# Patient Record
Sex: FEMALE | Race: WHITE | NOT HISPANIC OR LATINO | Employment: STUDENT | URBAN - METROPOLITAN AREA
[De-identification: names, ages, dates, MRNs, and addresses within clinical notes are randomized per-mention and may not be internally consistent; named-entity substitution may affect disease eponyms.]

---
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
## 2023-12-02 PROBLEM — Z34.00 PREGNANCY, SUPERVISION, NORMAL, FIRST: Status: ACTIVE | Noted: 2023-12-02

## 2023-12-02 PROBLEM — Z01.419 WELL WOMAN EXAM: Status: ACTIVE | Noted: 2023-12-02

## 2023-12-07 ENCOUNTER — LAB (OUTPATIENT)
Dept: LAB | Facility: HOSPITAL | Age: 23
End: 2023-12-07
Payer: COMMERCIAL

## 2023-12-07 ENCOUNTER — INITIAL PRENATAL (OUTPATIENT)
Dept: OBSTETRICS AND GYNECOLOGY | Facility: CLINIC | Age: 23
End: 2023-12-07
Payer: COMMERCIAL

## 2023-12-07 VITALS — BODY MASS INDEX: 25.46 KG/M2 | WEIGHT: 153 LBS | DIASTOLIC BLOOD PRESSURE: 76 MMHG | SYSTOLIC BLOOD PRESSURE: 124 MMHG

## 2023-12-07 DIAGNOSIS — Z71.85 VACCINE COUNSELING: ICD-10-CM

## 2023-12-07 DIAGNOSIS — Z34.01 ENCOUNTER FOR SUPERVISION OF NORMAL FIRST PREGNANCY IN FIRST TRIMESTER: Primary | ICD-10-CM

## 2023-12-07 DIAGNOSIS — Z34.01 ENCOUNTER FOR SUPERVISION OF NORMAL FIRST PREGNANCY IN FIRST TRIMESTER: ICD-10-CM

## 2023-12-07 PROBLEM — N08 HSP (HENOCH-SCHONLEIN PURPURA) NEPHRITIS: Status: ACTIVE | Noted: 2023-12-07

## 2023-12-07 PROBLEM — D69.0 HSP (HENOCH-SCHONLEIN PURPURA) NEPHRITIS: Status: ACTIVE | Noted: 2023-12-07

## 2023-12-07 LAB
ABO GROUP BLD: NORMAL
BASOPHILS # BLD AUTO: 0.02 10*3/MM3 (ref 0–0.2)
BASOPHILS NFR BLD AUTO: 0.2 % (ref 0–1.5)
BLD GP AB SCN SERPL QL: NEGATIVE
DEPRECATED RDW RBC AUTO: 36.4 FL (ref 37–54)
EOSINOPHIL # BLD AUTO: 0.02 10*3/MM3 (ref 0–0.4)
EOSINOPHIL NFR BLD AUTO: 0.2 % (ref 0.3–6.2)
ERYTHROCYTE [DISTWIDTH] IN BLOOD BY AUTOMATED COUNT: 11.3 % (ref 12.3–15.4)
HBV SURFACE AG SERPL QL IA: NORMAL
HCT VFR BLD AUTO: 42.6 % (ref 34–46.6)
HCV AB SER DONR QL: NORMAL
HGB BLD-MCNC: 14.6 G/DL (ref 12–15.9)
HIV 1+2 AB+HIV1 P24 AG SERPL QL IA: NORMAL
IMM GRANULOCYTES # BLD AUTO: 0.04 10*3/MM3 (ref 0–0.05)
IMM GRANULOCYTES NFR BLD AUTO: 0.3 % (ref 0–0.5)
LYMPHOCYTES # BLD AUTO: 1.81 10*3/MM3 (ref 0.7–3.1)
LYMPHOCYTES NFR BLD AUTO: 14.2 % (ref 19.6–45.3)
MCH RBC QN AUTO: 30.7 PG (ref 26.6–33)
MCHC RBC AUTO-ENTMCNC: 34.3 G/DL (ref 31.5–35.7)
MCV RBC AUTO: 89.7 FL (ref 79–97)
MONOCYTES # BLD AUTO: 0.59 10*3/MM3 (ref 0.1–0.9)
MONOCYTES NFR BLD AUTO: 4.6 % (ref 5–12)
NEUTROPHILS NFR BLD AUTO: 10.27 10*3/MM3 (ref 1.7–7)
NEUTROPHILS NFR BLD AUTO: 80.5 % (ref 42.7–76)
NRBC BLD AUTO-RTO: 0 /100 WBC (ref 0–0.2)
PLATELET # BLD AUTO: 283 10*3/MM3 (ref 140–450)
PMV BLD AUTO: 11.6 FL (ref 6–12)
RBC # BLD AUTO: 4.75 10*6/MM3 (ref 3.77–5.28)
RH BLD: POSITIVE
RPR SER QL: NORMAL
WBC NRBC COR # BLD AUTO: 12.75 10*3/MM3 (ref 3.4–10.8)

## 2023-12-07 PROCEDURE — 80081 OBSTETRIC PANEL INC HIV TSTG: CPT

## 2023-12-07 PROCEDURE — 86803 HEPATITIS C AB TEST: CPT

## 2023-12-07 RX ORDER — PROMETHAZINE HYDROCHLORIDE 25 MG/1
25 TABLET ORAL EVERY 6 HOURS PRN
Qty: 30 TABLET | Refills: 0 | Status: SHIPPED | OUTPATIENT
Start: 2023-12-07

## 2023-12-07 RX ORDER — ONDANSETRON 4 MG/1
4 TABLET, FILM COATED ORAL EVERY 6 HOURS PRN
Qty: 30 TABLET | Refills: 1 | Status: SHIPPED | OUTPATIENT
Start: 2023-12-07 | End: 2024-03-06

## 2023-12-07 RX ORDER — PRENATAL VIT NO.126/IRON/FOLIC 28MG-0.8MG
1 TABLET ORAL DAILY
COMMUNITY

## 2023-12-07 NOTE — PROGRESS NOTES
Subjective   Chief Complaint   Patient presents with    Initial Prenatal Visit     US ADRIÁN today, Struggling with n/v all day for last few weeks       Ynes Mcdaniel is a 23 y.o. year old .  No LMP recorded (lmp unknown). Patient is pregnant.  She presents to be seen to initiate prenatal care.     Social History    Tobacco Use      Smoking status: Never      Smokeless tobacco: Never      The following portions of the patient's history were reviewed and updated as appropriate:vital signs, allergies, current medications, past medical history, past social history, past surgical history, and problem list.    Objective   Lab Review   No data reviewed    Imaging   Pelvic ultrasound report    Assessment & Plan   ASSESSMENT  23 y.o. year old  at 8w4d  Supervision of low risk pregnancy    PLAN  The problem list for pregnancy was initiated today  Tests ordered today:  Orders Placed This Encounter   Procedures    Urine Culture - Urine, Urine, Clean Catch     Standing Status:   Future     Order Specific Question:   Release to patient     Answer:   Routine Release [[id number]]    Chlamydia trachomatis, Neisseria gonorrhoeae, PCR w/ confirmation - Urine, Urine, Clean Catch     Standing Status:   Future     Order Specific Question:   Release to patient     Answer:   Routine Release [[id number]]    Obstetric Panel     Standing Status:   Future     Order Specific Question:   Release to patient     Answer:   Routine Release [[id number]]    HIV-1 / O / 2 Ag / Antibody     Standing Status:   Future     Order Specific Question:   Release to patient     Answer:   Routine Release [[id number]]    Urine Drug Screen - Urine, Clean Catch     Please confirm all positive UDS     Standing Status:   Future     Order Specific Question:   Release to patient     Answer:   Routine Release [[id number]]     Testing for GC / Chlamydia / trichomonas was done today  Genetic testing reviewed: NIPT ([facility])  Her vaccine record was reviewed and  updated.  I discussed with Ynes that she may be behind on needed vaccinations for Influenza and COVID.  She may be able to obtain these vaccinations at her local pharmacy OR speak about obtaining them with her primary care.  If she does obtain her vaccines, I have asked Ynes to let us know the date each vaccine was obtained so that her medical record could be updated in our system.  Information reviewed: exercise in pregnancy, nutrition in pregnancy, weight gain in pregnancy, work and travel restrictions during pregnancy, list of OTC medications acceptable in pregnancy, and call coverage groups    Total time spent today with Ynes  was 30-44 minutes (level 3).  Off this time, 100% was spent face-to-face time coordinating care, answering her questions and counseling regarding exercise in pregnancy, nutrition in pregnancy, weight gain in pregnancy, work and travel restrictions during pregnancy, list of OTC medications acceptable in pregnancy and call coverage groups and pathophysiology of her presenting problem along with plans for any diagnositc work-up and treatment.  The time reported only includes face-to-face time and excludes any procedural based activities that occurred on the same date of service.    New Medications Ordered This Visit   Medications    promethazine (PHENERGAN) 25 MG tablet     Sig: Take 1 tablet by mouth Every 6 (Six) Hours As Needed for Nausea or Vomiting.     Dispense:  30 tablet     Refill:  0    ondansetron (Zofran) 4 MG tablet     Sig: Take 1 tablet by mouth Every 6 (Six) Hours As Needed for Nausea or Vomiting for up to 90 days.     Dispense:  30 tablet     Refill:  1       Follow up: 4 week(s)       This note was electronically signed.    Anatoly Lo MD  December 7, 2023

## 2023-12-08 LAB — RUBV IGG SERPL IA-ACNC: 4.57 INDEX

## 2023-12-11 ENCOUNTER — TELEPHONE (OUTPATIENT)
Dept: OBSTETRICS AND GYNECOLOGY | Facility: CLINIC | Age: 23
End: 2023-12-11
Payer: COMMERCIAL

## 2023-12-11 DIAGNOSIS — O36.8990 UMBILICAL CORD CYST DURING PREGNANCY, ANTEPARTUM: Primary | ICD-10-CM

## 2023-12-11 NOTE — TELEPHONE ENCOUNTER
Please call Ynes and let her know I have her had a chance to review her ultrasound.  Things overall look fine but there is a small cyst in the middle of the umbilical cord.  These almost never have any meaning and will almost always resolve after the first trimester.  When she comes back for her next scheduled prenatal visit I want to get an ultrasound done to relook at the umbilical cord cyst.

## 2023-12-15 NOTE — TELEPHONE ENCOUNTER
Pt informed and stated understanding.  Pt has an ultrasound scheduled for her next appt on 1/8/24.

## 2024-01-08 ENCOUNTER — LAB (OUTPATIENT)
Dept: LAB | Facility: HOSPITAL | Age: 24
End: 2024-01-08
Payer: COMMERCIAL

## 2024-01-08 ENCOUNTER — ROUTINE PRENATAL (OUTPATIENT)
Dept: OBSTETRICS AND GYNECOLOGY | Facility: CLINIC | Age: 24
End: 2024-01-08
Payer: COMMERCIAL

## 2024-01-08 VITALS — SYSTOLIC BLOOD PRESSURE: 110 MMHG | BODY MASS INDEX: 24.96 KG/M2 | WEIGHT: 150 LBS | DIASTOLIC BLOOD PRESSURE: 76 MMHG

## 2024-01-08 DIAGNOSIS — Z34.01 ENCOUNTER FOR SUPERVISION OF NORMAL FIRST PREGNANCY IN FIRST TRIMESTER: ICD-10-CM

## 2024-01-08 DIAGNOSIS — Z34.01 ENCOUNTER FOR SUPERVISION OF NORMAL FIRST PREGNANCY IN FIRST TRIMESTER: Primary | ICD-10-CM

## 2024-01-08 DIAGNOSIS — Z71.85 VACCINE COUNSELING: ICD-10-CM

## 2024-01-08 PROBLEM — O36.8990 UMBILICAL CORD CYST DURING PREGNANCY, ANTEPARTUM: Status: RESOLVED | Noted: 2023-12-11 | Resolved: 2024-01-08

## 2024-01-08 PROBLEM — R82.5 POSITIVE URINE DRUG SCREEN: Status: ACTIVE | Noted: 2024-01-08

## 2024-01-08 LAB
2ND TRIMESTER 4 SCREEN SERPL-IMP: NORMAL
AFP INTERP SERPL-IMP: NORMAL
AMPHET+METHAMPHET UR QL: NEGATIVE
AMPHETAMINES UR QL: NEGATIVE
BARBITURATES UR QL SCN: NEGATIVE
BENZODIAZ UR QL SCN: NEGATIVE
BUPRENORPHINE SERPL-MCNC: NEGATIVE NG/ML
CANNABINOIDS SERPL QL: POSITIVE
COCAINE UR QL: NEGATIVE
EXTERNAL NIPT: NORMAL
FENTANYL UR-MCNC: POSITIVE NG/ML
METHADONE UR QL SCN: NEGATIVE
OPIATES UR QL: NEGATIVE
OXYCODONE UR QL SCN: NEGATIVE
PCP UR QL SCN: NEGATIVE
TRICYCLICS UR QL SCN: NEGATIVE

## 2024-01-08 PROCEDURE — 87491 CHLMYD TRACH DNA AMP PROBE: CPT

## 2024-01-08 PROCEDURE — G0480 DRUG TEST DEF 1-7 CLASSES: HCPCS

## 2024-01-08 PROCEDURE — 80307 DRUG TEST PRSMV CHEM ANLYZR: CPT

## 2024-01-08 PROCEDURE — 0502F SUBSEQUENT PRENATAL CARE: CPT | Performed by: OBSTETRICS & GYNECOLOGY

## 2024-01-08 PROCEDURE — 87086 URINE CULTURE/COLONY COUNT: CPT

## 2024-01-08 PROCEDURE — 87591 N.GONORRHOEAE DNA AMP PROB: CPT

## 2024-01-08 NOTE — PROGRESS NOTES
Chief Complaint   Patient presents with    Routine Prenatal Visit       HPI: Ynes is a  currently at 13w1d who today reports the following:  Nausea - improved as compared to the prior visit; Vaginal bleeding -  No; Heartburn - No.    ROS:  GI: Constipation - No; Diarrhea - No    Neuro: Headache - No; Visual change - No    Respiratory: Cough - No; SOB - No; fever - No     EXAM:  Vitals: See prenatal flowsheet   Abdomen: See prenatal flowsheet   Urine glucose/protein: See prenatal flowsheet   Pelvic: See prenatal flowsheet     Prenatal Labs  Lab Results   Component Value Date    HGB 14.6 2023    RUBELLAABIGG 4.57 2023    HEPBSAG Non-Reactive 2023    ABSCRN Negative 2023    BFB4IQX7 Non-Reactive 2023    HEPCVIRUSABY Non-Reactive 2023    URINECX No growth 2019       MDM:  Impression: Supervision of low risk pregnancy  Umbilical cord cyst, resolved   Tests done today: U/S for follow-up of her umbilical cord cyst  Still needs urine testing from first visit   Topics discussed: Continue with PNV's  Prenatal labs reviewed  Today we discussed genetic testing.  They are aware that the NIPT - Panorama is a screening test.  A screening test is not a diagnostic test.  This means that a negative test does not guarantee an unaffected fetus and a positive test does not mean the fetus has the condition for which the test is being performed.  If the test returns positive, a diagnostic test should be consider to determine if the fetus in fact has the condition.  After considering the options previously presented, she is not interested in having genetic testing performed.  Has elected not to have influenza or COVID vaccinations while pregnant   Tests scheduled today for her next visit:   none

## 2024-01-09 LAB — BACTERIA SPEC AEROBE CULT: ABNORMAL

## 2024-01-12 LAB
C TRACH RRNA SPEC QL NAA+PROBE: NEGATIVE
CANNABINOIDS UR QL CFM: POSITIVE
FENTANYL UR CFM-MCNC: NOT DETECTED NG/MG CREAT
FENTANYL UR QL CFM: NEGATIVE
N GONORRHOEA RRNA SPEC QL NAA+PROBE: NEGATIVE
NORFENTANYL UR CFM-MCNC: NOT DETECTED NG/MG CREAT

## 2024-02-13 ENCOUNTER — ROUTINE PRENATAL (OUTPATIENT)
Dept: OBSTETRICS AND GYNECOLOGY | Facility: CLINIC | Age: 24
End: 2024-02-13
Payer: COMMERCIAL

## 2024-02-13 VITALS — DIASTOLIC BLOOD PRESSURE: 72 MMHG | WEIGHT: 161 LBS | SYSTOLIC BLOOD PRESSURE: 116 MMHG | BODY MASS INDEX: 26.79 KG/M2

## 2024-02-13 DIAGNOSIS — Z34.02 ENCOUNTER FOR SUPERVISION OF NORMAL FIRST PREGNANCY IN SECOND TRIMESTER: Primary | ICD-10-CM

## 2024-03-05 ENCOUNTER — ROUTINE PRENATAL (OUTPATIENT)
Dept: OBSTETRICS AND GYNECOLOGY | Facility: CLINIC | Age: 24
End: 2024-03-05
Payer: COMMERCIAL

## 2024-03-05 VITALS — BODY MASS INDEX: 27.46 KG/M2 | SYSTOLIC BLOOD PRESSURE: 118 MMHG | WEIGHT: 165 LBS | DIASTOLIC BLOOD PRESSURE: 74 MMHG

## 2024-03-05 DIAGNOSIS — Z34.02 ENCOUNTER FOR SUPERVISION OF NORMAL FIRST PREGNANCY IN SECOND TRIMESTER: Primary | ICD-10-CM

## 2024-03-05 PROCEDURE — 0502F SUBSEQUENT PRENATAL CARE: CPT | Performed by: OBSTETRICS & GYNECOLOGY

## 2024-03-05 NOTE — PROGRESS NOTES
Chief Complaint   Patient presents with    Routine Prenatal Visit       HPI: Ynes is a  currently at 21w2d who today reports the following:  Contractions - No; Leaking - No; Vaginal bleeding - No; Heartburn - No.    ROS:  GI: Nausea - No ; Constipation - No; Diarrhea - No    Neuro: Headache - No ; Visual change - No    Respiratory: Cough - No; SOB - No; fever - No     EXAM:  Vitals: See prenatal flowsheet   Abdomen: See prenatal flowsheet   Urine glucose/protein: See prenatal flowsheet   Pelvic: See prenatal flowsheet     Lab Results   Component Value Date    ABO B 2023    RH Positive 2023    ABSCRN Negative 2023       MDM:  Impression: Supervision of low risk pregnancy   Tests ordered/done today: U/S for anatomic screening - anatomy was not completely seen today   Counseling: Prenatal labs reviewed  Need to continue with PNV's  No additional counseling given - she has no specific complaints or concerns   Tests ordered today for her next visit:   U/S to complete the anatomic screening

## 2024-04-11 ENCOUNTER — ROUTINE PRENATAL (OUTPATIENT)
Dept: OBSTETRICS AND GYNECOLOGY | Facility: CLINIC | Age: 24
End: 2024-04-11
Payer: COMMERCIAL

## 2024-04-11 VITALS — SYSTOLIC BLOOD PRESSURE: 122 MMHG | DIASTOLIC BLOOD PRESSURE: 70 MMHG | BODY MASS INDEX: 28.29 KG/M2 | WEIGHT: 170 LBS

## 2024-04-11 DIAGNOSIS — O26.892 HEARTBURN DURING PREGNANCY IN SECOND TRIMESTER: ICD-10-CM

## 2024-04-11 DIAGNOSIS — Z34.02 ENCOUNTER FOR SUPERVISION OF NORMAL FIRST PREGNANCY IN SECOND TRIMESTER: Primary | ICD-10-CM

## 2024-04-11 DIAGNOSIS — R12 HEARTBURN DURING PREGNANCY IN SECOND TRIMESTER: ICD-10-CM

## 2024-04-11 PROBLEM — O26.899 HEARTBURN DURING PREGNANCY: Status: ACTIVE | Noted: 2024-04-11

## 2024-04-11 PROCEDURE — 0502F SUBSEQUENT PRENATAL CARE: CPT | Performed by: OBSTETRICS & GYNECOLOGY

## 2024-04-11 RX ORDER — FAMOTIDINE 20 MG/1
20 TABLET, FILM COATED ORAL 2 TIMES DAILY PRN
Start: 2024-04-11

## 2024-04-11 RX ORDER — OMEPRAZOLE 20 MG/1
20 TABLET, DELAYED RELEASE ORAL DAILY
Start: 2024-04-11

## 2024-04-11 NOTE — PROGRESS NOTES
Chief Complaint   Patient presents with    Routine Prenatal Visit       HPI: Ynes is a  currently at 26w4d who today reports the following:  Contractions - No; Leaking - No; Vaginal bleeding - No; Heartburn - YES.    ROS:  GI: Nausea - YES ; Constipation - No; Diarrhea - No    Neuro: Headache - No ; Visual change - No    Respiratory: Cough - No; SOB - No; fever - No     EXAM:  Vitals: See prenatal flowsheet   Abdomen: See prenatal flowsheet   Urine glucose/protein: See prenatal flowsheet   Pelvic: See prenatal flowsheet     Lab Results   Component Value Date    ABO B 2023    RH Positive 2023    ABSCRN Negative 2023       MDM:  Impression: Supervision of high risk pregnancy  GERD in pregnancy   Tests ordered/done today: U/S to complete the anatomic screening - anatomy completely seen today   Counseling: Prenatal labs reviewed  Need to continue with PNV's  OTC medical options for her N/V with GERD   Tests ordered today for her next visit:   GCT  HgB  RPR      New Medications Ordered This Visit   Medications    omeprazole OTC (PrilOSEC OTC) 20 MG EC tablet     Sig: Take 1 tablet by mouth Daily. Take daily 30 minutes prior to first morning meal    famotidine (Pepcid) 20 MG tablet     Sig: Take 1 tablet by mouth 2 (Two) Times a Day As Needed for Heartburn.

## 2024-04-29 ENCOUNTER — LAB (OUTPATIENT)
Dept: LAB | Facility: HOSPITAL | Age: 24
End: 2024-04-29
Payer: COMMERCIAL

## 2024-04-29 ENCOUNTER — ROUTINE PRENATAL (OUTPATIENT)
Dept: OBSTETRICS AND GYNECOLOGY | Facility: CLINIC | Age: 24
End: 2024-04-29
Payer: COMMERCIAL

## 2024-04-29 VITALS — DIASTOLIC BLOOD PRESSURE: 86 MMHG | WEIGHT: 173.6 LBS | BODY MASS INDEX: 28.89 KG/M2 | SYSTOLIC BLOOD PRESSURE: 120 MMHG

## 2024-04-29 DIAGNOSIS — O26.892 HEARTBURN DURING PREGNANCY IN SECOND TRIMESTER: ICD-10-CM

## 2024-04-29 DIAGNOSIS — R12 HEARTBURN DURING PREGNANCY IN SECOND TRIMESTER: ICD-10-CM

## 2024-04-29 DIAGNOSIS — Z34.02 ENCOUNTER FOR SUPERVISION OF NORMAL FIRST PREGNANCY IN SECOND TRIMESTER: ICD-10-CM

## 2024-04-29 DIAGNOSIS — Z34.03 ENCOUNTER FOR SUPERVISION OF NORMAL FIRST PREGNANCY IN THIRD TRIMESTER: ICD-10-CM

## 2024-04-29 DIAGNOSIS — Z3A.29 29 WEEKS GESTATION OF PREGNANCY: Primary | ICD-10-CM

## 2024-04-29 DIAGNOSIS — Z71.85 VACCINE COUNSELING: ICD-10-CM

## 2024-04-29 LAB
GLUCOSE 1H P 100 G GLC PO SERPL-MCNC: 79 MG/DL (ref 65–139)
GLUCOSE UR STRIP-MCNC: NEGATIVE MG/DL
HCT VFR BLD AUTO: 36.5 % (ref 34–46.6)
HGB BLD-MCNC: 12.4 G/DL (ref 12–15.9)
PROT UR STRIP-MCNC: NEGATIVE MG/DL

## 2024-04-29 PROCEDURE — 0502F SUBSEQUENT PRENATAL CARE: CPT | Performed by: NURSE PRACTITIONER

## 2024-04-29 PROCEDURE — 36415 COLL VENOUS BLD VENIPUNCTURE: CPT

## 2024-04-29 PROCEDURE — 86592 SYPHILIS TEST NON-TREP QUAL: CPT

## 2024-04-29 PROCEDURE — 85018 HEMOGLOBIN: CPT

## 2024-04-29 PROCEDURE — 85014 HEMATOCRIT: CPT

## 2024-04-29 PROCEDURE — 82950 GLUCOSE TEST: CPT

## 2024-04-29 PROCEDURE — 82948 REAGENT STRIP/BLOOD GLUCOSE: CPT

## 2024-04-29 NOTE — PROGRESS NOTES
Chief Complaint   Patient presents with    Routine Prenatal Visit       HPI: Ynes is a  currently at 29w1d who today reports the following:  Contractions - No; Leaking - No; Vaginal bleeding -  No; Heartburn - YES. Just started pepcid yesterday    ROS:  GI: Nausea - improved as compared to the prior visit ; Constipation - No; Diarrhea - No    Neuro: Headache - No ; Visual change - No      EXAM:  Vitals: See prenatal flowsheet   Abdomen: See prenatal flowsheet   Urine glucose/protein: See prenatal flowsheet   Pelvic: See prenatal flowsheet     Lab Results   Component Value Date    ABO B 2023    RH Positive 2023    ABSCRN Negative 2023       MDM:  Impression: Supervision of low risk pregnancy  Gerd in pregnancy- recently started pepcid   Tests done today: GCT  HgB  RPR   Topics discussed: Continue with PNV's  Prenatal labs reviewed   labor signs and symptoms  Symptoms of preeclampsia  TDAP vaccination recommended between 27-36 weeks gestation OR after birth  Kick counts reviewed  Continue pepcid bid, if no improvement notify provider  Plans to receive tdap at next appt.    Tests scheduled today for her next visit:   none

## 2024-04-30 LAB — RPR SER QL: NORMAL

## 2024-05-10 ENCOUNTER — ROUTINE PRENATAL (OUTPATIENT)
Dept: OBSTETRICS AND GYNECOLOGY | Facility: CLINIC | Age: 24
End: 2024-05-10
Payer: COMMERCIAL

## 2024-05-10 VITALS — WEIGHT: 175 LBS | SYSTOLIC BLOOD PRESSURE: 130 MMHG | BODY MASS INDEX: 29.12 KG/M2 | DIASTOLIC BLOOD PRESSURE: 70 MMHG

## 2024-05-10 DIAGNOSIS — Z34.03 ENCOUNTER FOR SUPERVISION OF NORMAL FIRST PREGNANCY IN THIRD TRIMESTER: ICD-10-CM

## 2024-05-10 DIAGNOSIS — R12 HEARTBURN DURING PREGNANCY IN SECOND TRIMESTER: ICD-10-CM

## 2024-05-10 DIAGNOSIS — Z3A.30 30 WEEKS GESTATION OF PREGNANCY: Primary | ICD-10-CM

## 2024-05-10 DIAGNOSIS — Z71.85 VACCINE COUNSELING: ICD-10-CM

## 2024-05-10 DIAGNOSIS — O26.892 HEARTBURN DURING PREGNANCY IN SECOND TRIMESTER: ICD-10-CM

## 2024-05-10 LAB
COLOR UR: NORMAL
GLUCOSE UR STRIP-MCNC: NEGATIVE MG/DL
PROT UR STRIP-MCNC: NEGATIVE MG/DL

## 2024-05-24 ENCOUNTER — ROUTINE PRENATAL (OUTPATIENT)
Dept: OBSTETRICS AND GYNECOLOGY | Facility: CLINIC | Age: 24
End: 2024-05-24
Payer: COMMERCIAL

## 2024-05-24 VITALS — SYSTOLIC BLOOD PRESSURE: 116 MMHG | WEIGHT: 177 LBS | BODY MASS INDEX: 29.45 KG/M2 | DIASTOLIC BLOOD PRESSURE: 72 MMHG

## 2024-05-24 DIAGNOSIS — Z34.03 ENCOUNTER FOR SUPERVISION OF NORMAL FIRST PREGNANCY IN THIRD TRIMESTER: Primary | ICD-10-CM

## 2024-05-24 DIAGNOSIS — Z71.85 VACCINE COUNSELING: ICD-10-CM

## 2024-05-24 DIAGNOSIS — O26.893 HEARTBURN DURING PREGNANCY IN THIRD TRIMESTER: ICD-10-CM

## 2024-05-24 DIAGNOSIS — R12 HEARTBURN DURING PREGNANCY IN THIRD TRIMESTER: ICD-10-CM

## 2024-05-24 NOTE — PATIENT INSTRUCTIONS

## 2024-05-24 NOTE — PROGRESS NOTES
Chief Complaint   Patient presents with    Routine Prenatal Visit       HPI: Ynes is a  currently at 32w5d who today reports the following:  Contractions - No; Leaking - No; Vaginal bleeding -  No; Swelling of extremities - No.    ROS:  GI: Nausea - improved as compared to the prior visit; Constipation - No; Diarrhea - No    Neuro: Headache - No; Visual change - No    Respiratory: Cough - No; SOB - No; fever - No     EXAM:  Vitals: See prenatal flowsheet   Abdomen: See prenatal flowsheet   Urine glucose/protein: See prenatal flowsheet   Pelvic: See prenatal flowsheet   MDM:   Impression: Supervision of high risk pregnancy  GERD in pregnancy   Tests done today: none   Topics discussed: Prenatal labs reviewed  Continue with Rx prenatal vitamins and OTC H2 blockers.  Breast feeding  Childbirth classes  TDAP vaccination recommended between 27-36 weeks gestation OR after birth  Circumcision was discussed.  It was explained to Ynes that the procedure is elective.  There are probably no long-term medical benefits for circumcision.  Studies have suggested in the first year of life, there may be a reduction in urinary tract infections in a circumcised male's.  This difference disappears after the first year of life.  Some have suggested that circumcision reduces nerve endings and the tip of the penis which could have an impact to him as he ages.  There are small risks of the procedure including taking off to much or too little skin from the foreskin.  He will be anesthetized and most of the time this is successful.  It cannot be guaranteed that the child will feel no pain.  After hearing this information, she wishes to have her son circumsized.   Tests scheduled today for her next visit:   none

## 2024-06-07 ENCOUNTER — ROUTINE PRENATAL (OUTPATIENT)
Dept: OBSTETRICS AND GYNECOLOGY | Facility: CLINIC | Age: 24
End: 2024-06-07
Payer: COMMERCIAL

## 2024-06-07 VITALS — BODY MASS INDEX: 29.79 KG/M2 | SYSTOLIC BLOOD PRESSURE: 110 MMHG | DIASTOLIC BLOOD PRESSURE: 72 MMHG | WEIGHT: 179 LBS

## 2024-06-07 DIAGNOSIS — R12 HEARTBURN DURING PREGNANCY IN THIRD TRIMESTER: ICD-10-CM

## 2024-06-07 DIAGNOSIS — Z34.03 ENCOUNTER FOR SUPERVISION OF NORMAL FIRST PREGNANCY IN THIRD TRIMESTER: Primary | ICD-10-CM

## 2024-06-07 DIAGNOSIS — O26.893 HEARTBURN DURING PREGNANCY IN THIRD TRIMESTER: ICD-10-CM

## 2024-06-07 LAB
GLUCOSE UR STRIP-MCNC: NEGATIVE MG/DL
PROT UR STRIP-MCNC: ABNORMAL MG/DL

## 2024-06-07 NOTE — PROGRESS NOTES
Chief Complaint   Patient presents with    Routine Prenatal Visit       HPI: Ynes is a  currently at 34w5d who today reports the following:  Contractions - No; Leaking - No; Vaginal bleeding -  No; Swelling of extremities - No.    ROS:  GI: Nausea - No; Constipation - No; Diarrhea - No    Neuro: Headache - No; Visual change - No    Respiratory: Cough - No; SOB - No; fever - No     EXAM:  Vitals: See prenatal flowsheet   Abdomen: See prenatal flowsheet   Urine glucose/protein: See prenatal flowsheet   Pelvic: See prenatal flowsheet   MDM:   Impression: Supervision of low risk pregnancy  Heartburn well-controlled with Pepcid   Tests done today: none   Topics discussed: Prenatal labs reviewed  Continue with Rx prenatal vitamins and OTC H2 blockers.  TDAP vaccination recommended between 27-36 weeks gestation OR after birth.  Right now she continues to decline at this   Tests scheduled today for her next visit:   none

## 2024-06-13 ENCOUNTER — ROUTINE PRENATAL (OUTPATIENT)
Dept: OBSTETRICS AND GYNECOLOGY | Facility: CLINIC | Age: 24
End: 2024-06-13
Payer: COMMERCIAL

## 2024-06-13 ENCOUNTER — HOSPITAL ENCOUNTER (INPATIENT)
Facility: HOSPITAL | Age: 24
LOS: 3 days | Discharge: HOME | End: 2024-06-16
Attending: OBSTETRICS & GYNECOLOGY | Admitting: OBSTETRICS & GYNECOLOGY
Payer: COMMERCIAL

## 2024-06-13 ENCOUNTER — ANESTHESIA (OUTPATIENT)
Dept: LABOR AND DELIVERY | Facility: HOSPITAL | Age: 24
End: 2024-06-13
Payer: COMMERCIAL

## 2024-06-13 ENCOUNTER — ANESTHESIA EVENT (OUTPATIENT)
Dept: LABOR AND DELIVERY | Facility: HOSPITAL | Age: 24
End: 2024-06-13
Payer: COMMERCIAL

## 2024-06-13 VITALS — DIASTOLIC BLOOD PRESSURE: 110 MMHG | BODY MASS INDEX: 30.45 KG/M2 | SYSTOLIC BLOOD PRESSURE: 150 MMHG | WEIGHT: 183 LBS

## 2024-06-13 DIAGNOSIS — O16.3 ELEVATED BLOOD PRESSURE AFFECTING PREGNANCY IN THIRD TRIMESTER, ANTEPARTUM: ICD-10-CM

## 2024-06-13 DIAGNOSIS — R12 HEARTBURN DURING PREGNANCY IN THIRD TRIMESTER: ICD-10-CM

## 2024-06-13 DIAGNOSIS — Z3A.35 35 WEEKS GESTATION OF PREGNANCY: Primary | ICD-10-CM

## 2024-06-13 DIAGNOSIS — Z36.85 ANTENATAL SCREENING FOR STREPTOCOCCUS B: ICD-10-CM

## 2024-06-13 DIAGNOSIS — O26.893 HEARTBURN DURING PREGNANCY IN THIRD TRIMESTER: ICD-10-CM

## 2024-06-13 DIAGNOSIS — Z34.03 ENCOUNTER FOR SUPERVISION OF NORMAL FIRST PREGNANCY IN THIRD TRIMESTER: ICD-10-CM

## 2024-06-13 PROBLEM — O14.13 SEVERE PREECLAMPSIA, THIRD TRIMESTER: Status: ACTIVE | Noted: 2024-06-13

## 2024-06-13 LAB
ABO GROUP BLD: NORMAL
ALP SERPL-CCNC: 113 U/L (ref 39–117)
ALT SERPL W P-5'-P-CCNC: 9 U/L (ref 1–33)
AST SERPL-CCNC: 14 U/L (ref 1–32)
BASOPHILS # BLD AUTO: 0.02 10*3/MM3 (ref 0–0.2)
BASOPHILS NFR BLD AUTO: 0.2 % (ref 0–1.5)
BILIRUB SERPL-MCNC: <0.2 MG/DL (ref 0–1.2)
BLD GP AB SCN SERPL QL: NEGATIVE
CREAT SERPL-MCNC: 0.85 MG/DL (ref 0.57–1)
DEPRECATED RDW RBC AUTO: 42 FL (ref 37–54)
EOSINOPHIL # BLD AUTO: 0.03 10*3/MM3 (ref 0–0.4)
EOSINOPHIL NFR BLD AUTO: 0.3 % (ref 0.3–6.2)
ERYTHROCYTE [DISTWIDTH] IN BLOOD BY AUTOMATED COUNT: 12.9 % (ref 12.3–15.4)
EXPIRATION DATE: ABNORMAL
GLUCOSE UR STRIP-MCNC: NEGATIVE MG/DL
HCT VFR BLD AUTO: 34.7 % (ref 34–46.6)
HGB BLD-MCNC: 11.5 G/DL (ref 12–15.9)
IMM GRANULOCYTES # BLD AUTO: 0.04 10*3/MM3 (ref 0–0.05)
IMM GRANULOCYTES NFR BLD AUTO: 0.5 % (ref 0–0.5)
LDH SERPL-CCNC: 235 U/L (ref 135–214)
LYMPHOCYTES # BLD AUTO: 1.78 10*3/MM3 (ref 0.7–3.1)
LYMPHOCYTES NFR BLD AUTO: 20.3 % (ref 19.6–45.3)
Lab: ABNORMAL
MCH RBC QN AUTO: 30 PG (ref 26.6–33)
MCHC RBC AUTO-ENTMCNC: 33.1 G/DL (ref 31.5–35.7)
MCV RBC AUTO: 90.6 FL (ref 79–97)
MONOCYTES # BLD AUTO: 0.63 10*3/MM3 (ref 0.1–0.9)
MONOCYTES NFR BLD AUTO: 7.2 % (ref 5–12)
NEUTROPHILS NFR BLD AUTO: 6.29 10*3/MM3 (ref 1.7–7)
NEUTROPHILS NFR BLD AUTO: 71.5 % (ref 42.7–76)
NRBC BLD AUTO-RTO: 0 /100 WBC (ref 0–0.2)
PLATELET # BLD AUTO: 203 10*3/MM3 (ref 140–450)
PMV BLD AUTO: 12.5 FL (ref 6–12)
PROT UR STRIP-MCNC: ABNORMAL MG/DL
PROT UR STRIP-MCNC: ABNORMAL MG/DL
RBC # BLD AUTO: 3.83 10*6/MM3 (ref 3.77–5.28)
RH BLD: POSITIVE
T PALLIDUM IGG SER QL: NORMAL
T&S EXPIRATION DATE: NORMAL
URATE SERPL-MCNC: 5.6 MG/DL (ref 2.4–5.7)
WBC NRBC COR # BLD AUTO: 8.79 10*3/MM3 (ref 3.4–10.8)

## 2024-06-13 PROCEDURE — 25010000002 LABETALOL 5 MG/ML SOLUTION: Performed by: OBSTETRICS & GYNECOLOGY

## 2024-06-13 PROCEDURE — 86850 RBC ANTIBODY SCREEN: CPT | Performed by: OBSTETRICS & GYNECOLOGY

## 2024-06-13 PROCEDURE — 85025 COMPLETE CBC W/AUTO DIFF WBC: CPT | Performed by: NURSE PRACTITIONER

## 2024-06-13 PROCEDURE — 86780 TREPONEMA PALLIDUM: CPT | Performed by: OBSTETRICS & GYNECOLOGY

## 2024-06-13 PROCEDURE — 84460 ALANINE AMINO (ALT) (SGPT): CPT | Performed by: NURSE PRACTITIONER

## 2024-06-13 PROCEDURE — C1755 CATHETER, INTRASPINAL: HCPCS | Performed by: ANESTHESIOLOGY

## 2024-06-13 PROCEDURE — 86901 BLOOD TYPING SEROLOGIC RH(D): CPT | Performed by: OBSTETRICS & GYNECOLOGY

## 2024-06-13 PROCEDURE — 83615 LACTATE (LD) (LDH) ENZYME: CPT | Performed by: NURSE PRACTITIONER

## 2024-06-13 PROCEDURE — 25810000003 LACTATED RINGERS PER 1000 ML: Performed by: OBSTETRICS & GYNECOLOGY

## 2024-06-13 PROCEDURE — 25010000002 CEFAZOLIN PER 500 MG: Performed by: OBSTETRICS & GYNECOLOGY

## 2024-06-13 PROCEDURE — 51703 INSERT BLADDER CATH COMPLEX: CPT

## 2024-06-13 PROCEDURE — 25010000002 HYDRALAZINE PER 20 MG: Performed by: OBSTETRICS & GYNECOLOGY

## 2024-06-13 PROCEDURE — 82247 BILIRUBIN TOTAL: CPT | Performed by: NURSE PRACTITIONER

## 2024-06-13 PROCEDURE — 82565 ASSAY OF CREATININE: CPT | Performed by: NURSE PRACTITIONER

## 2024-06-13 PROCEDURE — C1755 CATHETER, INTRASPINAL: HCPCS

## 2024-06-13 PROCEDURE — 25010000002 FENTANYL CITRATE (PF) 50 MCG/ML SOLUTION: Performed by: OBSTETRICS & GYNECOLOGY

## 2024-06-13 PROCEDURE — 81002 URINALYSIS NONAUTO W/O SCOPE: CPT | Performed by: NURSE PRACTITIONER

## 2024-06-13 PROCEDURE — 84075 ASSAY ALKALINE PHOSPHATASE: CPT | Performed by: NURSE PRACTITIONER

## 2024-06-13 PROCEDURE — 59025 FETAL NON-STRESS TEST: CPT

## 2024-06-13 PROCEDURE — 84450 TRANSFERASE (AST) (SGOT): CPT | Performed by: NURSE PRACTITIONER

## 2024-06-13 PROCEDURE — 25010000002 ROPIVACAINE PER 1 MG: Performed by: ANESTHESIOLOGY

## 2024-06-13 PROCEDURE — 36415 COLL VENOUS BLD VENIPUNCTURE: CPT | Performed by: NURSE PRACTITIONER

## 2024-06-13 PROCEDURE — 25810000003 SODIUM CHLORIDE 0.9 % SOLUTION: Performed by: ANESTHESIOLOGY

## 2024-06-13 PROCEDURE — 25010000002 BUPIVACAINE (PF) 0.5 % SOLUTION: Performed by: ANESTHESIOLOGY

## 2024-06-13 PROCEDURE — 86900 BLOOD TYPING SEROLOGIC ABO: CPT | Performed by: OBSTETRICS & GYNECOLOGY

## 2024-06-13 PROCEDURE — 59200 INSERT CERVICAL DILATOR: CPT | Performed by: OBSTETRICS & GYNECOLOGY

## 2024-06-13 PROCEDURE — 25010000002 MORPHINE PER 10 MG: Performed by: OBSTETRICS & GYNECOLOGY

## 2024-06-13 PROCEDURE — 25010000002 FENTANYL CITRATE (PF) 100 MCG/2ML SOLUTION: Performed by: ANESTHESIOLOGY

## 2024-06-13 PROCEDURE — 10907ZC DRAINAGE OF AMNIOTIC FLUID, THERAPEUTIC FROM PRODUCTS OF CONCEPTION, VIA NATURAL OR ARTIFICIAL OPENING: ICD-10-PCS | Performed by: OBSTETRICS & GYNECOLOGY

## 2024-06-13 PROCEDURE — 84550 ASSAY OF BLOOD/URIC ACID: CPT | Performed by: NURSE PRACTITIONER

## 2024-06-13 RX ORDER — SODIUM CHLORIDE 0.9 % (FLUSH) 0.9 %
10 SYRINGE (ML) INJECTION EVERY 12 HOURS SCHEDULED
Status: DISCONTINUED | OUTPATIENT
Start: 2024-06-13 | End: 2024-06-14

## 2024-06-13 RX ORDER — SODIUM CHLORIDE 0.9 % (FLUSH) 0.9 %
10 SYRINGE (ML) INJECTION AS NEEDED
Status: DISCONTINUED | OUTPATIENT
Start: 2024-06-13 | End: 2024-06-14

## 2024-06-13 RX ORDER — LABETALOL 200 MG/1
100 TABLET, FILM COATED ORAL EVERY 8 HOURS SCHEDULED
Status: DISCONTINUED | OUTPATIENT
Start: 2024-06-13 | End: 2024-06-14

## 2024-06-13 RX ORDER — MORPHINE SULFATE 2 MG/ML
1 INJECTION, SOLUTION INTRAMUSCULAR; INTRAVENOUS EVERY 4 HOURS PRN
Status: DISCONTINUED | OUTPATIENT
Start: 2024-06-13 | End: 2024-06-13 | Stop reason: SDUPTHER

## 2024-06-13 RX ORDER — EPHEDRINE SULFATE 5 MG/ML
10 INJECTION INTRAVENOUS
Status: DISCONTINUED | OUTPATIENT
Start: 2024-06-13 | End: 2024-06-14

## 2024-06-13 RX ORDER — MISOPROSTOL 100 MCG
50 TABLET ORAL
Status: COMPLETED | OUTPATIENT
Start: 2024-06-13 | End: 2024-06-13

## 2024-06-13 RX ORDER — METOCLOPRAMIDE HYDROCHLORIDE 5 MG/ML
10 INJECTION INTRAMUSCULAR; INTRAVENOUS ONCE AS NEEDED
Status: DISCONTINUED | OUTPATIENT
Start: 2024-06-13 | End: 2024-06-14

## 2024-06-13 RX ORDER — HYDROXYZINE HYDROCHLORIDE 25 MG/1
50 TABLET, FILM COATED ORAL NIGHTLY PRN
Status: DISCONTINUED | OUTPATIENT
Start: 2024-06-13 | End: 2024-06-14

## 2024-06-13 RX ORDER — CITRIC ACID/SODIUM CITRATE 334-500MG
30 SOLUTION, ORAL ORAL ONCE
Status: DISCONTINUED | OUTPATIENT
Start: 2024-06-13 | End: 2024-06-14

## 2024-06-13 RX ORDER — MAGNESIUM CARB/ALUMINUM HYDROX 105-160MG
30 TABLET,CHEWABLE ORAL ONCE
Status: COMPLETED | OUTPATIENT
Start: 2024-06-13 | End: 2024-06-14

## 2024-06-13 RX ORDER — OXYTOCIN/0.9 % SODIUM CHLORIDE 30/500 ML
2-20 PLASTIC BAG, INJECTION (ML) INTRAVENOUS
Status: DISCONTINUED | OUTPATIENT
Start: 2024-06-14 | End: 2024-06-14

## 2024-06-13 RX ORDER — NALOXONE HCL 0.4 MG/ML
0.4 VIAL (ML) INJECTION
Status: DISCONTINUED | OUTPATIENT
Start: 2024-06-13 | End: 2024-06-14

## 2024-06-13 RX ORDER — ONDANSETRON 2 MG/ML
4 INJECTION INTRAMUSCULAR; INTRAVENOUS EVERY 6 HOURS PRN
Status: DISCONTINUED | OUTPATIENT
Start: 2024-06-13 | End: 2024-06-14

## 2024-06-13 RX ORDER — SODIUM CHLORIDE, SODIUM LACTATE, POTASSIUM CHLORIDE, CALCIUM CHLORIDE 600; 310; 30; 20 MG/100ML; MG/100ML; MG/100ML; MG/100ML
125 INJECTION, SOLUTION INTRAVENOUS CONTINUOUS
Status: DISCONTINUED | OUTPATIENT
Start: 2024-06-13 | End: 2024-06-14

## 2024-06-13 RX ORDER — ACETAMINOPHEN 325 MG/1
650 TABLET ORAL EVERY 4 HOURS PRN
Status: DISCONTINUED | OUTPATIENT
Start: 2024-06-13 | End: 2024-06-14

## 2024-06-13 RX ORDER — ONDANSETRON 2 MG/ML
4 INJECTION INTRAMUSCULAR; INTRAVENOUS ONCE AS NEEDED
Status: DISCONTINUED | OUTPATIENT
Start: 2024-06-13 | End: 2024-06-14

## 2024-06-13 RX ORDER — MAGNESIUM SULFATE HEPTAHYDRATE 40 MG/ML
INJECTION, SOLUTION INTRAVENOUS
Status: COMPLETED
Start: 2024-06-13 | End: 2024-06-13

## 2024-06-13 RX ORDER — SODIUM CHLORIDE 9 MG/ML
40 INJECTION, SOLUTION INTRAVENOUS AS NEEDED
Status: DISCONTINUED | OUTPATIENT
Start: 2024-06-13 | End: 2024-06-14

## 2024-06-13 RX ORDER — MORPHINE SULFATE 2 MG/ML
2 INJECTION, SOLUTION INTRAMUSCULAR; INTRAVENOUS EVERY 4 HOURS PRN
Status: DISCONTINUED | OUTPATIENT
Start: 2024-06-13 | End: 2024-06-13 | Stop reason: SDUPTHER

## 2024-06-13 RX ORDER — FENTANYL CITRATE 50 UG/ML
100 INJECTION, SOLUTION INTRAMUSCULAR; INTRAVENOUS
Status: DISCONTINUED | OUTPATIENT
Start: 2024-06-13 | End: 2024-06-14

## 2024-06-13 RX ORDER — LIDOCAINE HYDROCHLORIDE AND EPINEPHRINE 15; 5 MG/ML; UG/ML
INJECTION, SOLUTION EPIDURAL AS NEEDED
Status: DISCONTINUED | OUTPATIENT
Start: 2024-06-13 | End: 2024-06-14 | Stop reason: SURG

## 2024-06-13 RX ORDER — BUPIVACAINE HYDROCHLORIDE 5 MG/ML
INJECTION, SOLUTION EPIDURAL; INTRACAUDAL AS NEEDED
Status: DISCONTINUED | OUTPATIENT
Start: 2024-06-13 | End: 2024-06-14 | Stop reason: SURG

## 2024-06-13 RX ORDER — PENICILLIN G 3000000 [IU]/50ML
3 INJECTION, SOLUTION INTRAVENOUS EVERY 4 HOURS
Qty: 600 ML | Refills: 0 | Status: DISCONTINUED | OUTPATIENT
Start: 2024-06-13 | End: 2024-06-13 | Stop reason: SDUPTHER

## 2024-06-13 RX ORDER — MAGNESIUM SULFATE HEPTAHYDRATE 40 MG/ML
2 INJECTION, SOLUTION INTRAVENOUS CONTINUOUS
Status: DISCONTINUED | OUTPATIENT
Start: 2024-06-13 | End: 2024-06-14

## 2024-06-13 RX ORDER — HYDRALAZINE HYDROCHLORIDE 20 MG/ML
5 INJECTION INTRAMUSCULAR; INTRAVENOUS EVERY 6 HOURS PRN
Status: DISCONTINUED | OUTPATIENT
Start: 2024-06-13 | End: 2024-06-14

## 2024-06-13 RX ORDER — FENTANYL CITRATE 50 UG/ML
50 INJECTION, SOLUTION INTRAMUSCULAR; INTRAVENOUS
Status: DISCONTINUED | OUTPATIENT
Start: 2024-06-13 | End: 2024-06-14

## 2024-06-13 RX ORDER — LABETALOL HYDROCHLORIDE 5 MG/ML
20-80 INJECTION, SOLUTION INTRAVENOUS
Status: DISPENSED | OUTPATIENT
Start: 2024-06-13 | End: 2024-06-14

## 2024-06-13 RX ORDER — NALOXONE HCL 0.4 MG/ML
0.4 VIAL (ML) INJECTION
Status: DISCONTINUED | OUTPATIENT
Start: 2024-06-13 | End: 2024-06-13 | Stop reason: SDUPTHER

## 2024-06-13 RX ORDER — LIDOCAINE HYDROCHLORIDE 10 MG/ML
0.5 INJECTION, SOLUTION EPIDURAL; INFILTRATION; INTRACAUDAL; PERINEURAL ONCE AS NEEDED
Status: DISCONTINUED | OUTPATIENT
Start: 2024-06-13 | End: 2024-06-14

## 2024-06-13 RX ORDER — DIPHENHYDRAMINE HYDROCHLORIDE 50 MG/ML
12.5 INJECTION INTRAMUSCULAR; INTRAVENOUS EVERY 8 HOURS PRN
Status: DISCONTINUED | OUTPATIENT
Start: 2024-06-13 | End: 2024-06-14

## 2024-06-13 RX ORDER — MISOPROSTOL 100 MCG
50 TABLET ORAL
Status: DISCONTINUED | OUTPATIENT
Start: 2024-06-13 | End: 2024-06-13 | Stop reason: SDUPTHER

## 2024-06-13 RX ORDER — FAMOTIDINE 10 MG/ML
20 INJECTION, SOLUTION INTRAVENOUS ONCE AS NEEDED
Status: COMPLETED | OUTPATIENT
Start: 2024-06-13 | End: 2024-06-14

## 2024-06-13 RX ADMIN — Medication 50 MCG: at 22:40

## 2024-06-13 RX ADMIN — FENTANYL CITRATE 100 MCG: 50 INJECTION, SOLUTION INTRAMUSCULAR; INTRAVENOUS at 21:27

## 2024-06-13 RX ADMIN — LABETALOL HYDROCHLORIDE 20 MG: 5 INJECTION, SOLUTION INTRAVENOUS at 20:15

## 2024-06-13 RX ADMIN — LABETALOL HYDROCHLORIDE 20 MG: 5 INJECTION, SOLUTION INTRAVENOUS at 17:35

## 2024-06-13 RX ADMIN — SODIUM CHLORIDE 2000 MG: 900 INJECTION INTRAVENOUS at 18:50

## 2024-06-13 RX ADMIN — LIDOCAINE HYDROCHLORIDE AND EPINEPHRINE 3 ML: 15; 5 INJECTION, SOLUTION EPIDURAL at 22:30

## 2024-06-13 RX ADMIN — LABETALOL HYDROCHLORIDE 40 MG: 5 INJECTION, SOLUTION INTRAVENOUS at 17:55

## 2024-06-13 RX ADMIN — BUPIVACAINE HYDROCHLORIDE 6 ML: 5 INJECTION, SOLUTION EPIDURAL; INTRACAUDAL at 22:30

## 2024-06-13 RX ADMIN — SODIUM CHLORIDE, POTASSIUM CHLORIDE, SODIUM LACTATE AND CALCIUM CHLORIDE 75 ML/HR: 600; 310; 30; 20 INJECTION, SOLUTION INTRAVENOUS at 17:08

## 2024-06-13 RX ADMIN — LABETALOL HYDROCHLORIDE 40 MG: 5 INJECTION, SOLUTION INTRAVENOUS at 20:31

## 2024-06-13 RX ADMIN — Medication 50 MCG: at 17:50

## 2024-06-13 RX ADMIN — MORPHINE SULFATE 1 MG: 2 INJECTION, SOLUTION INTRAMUSCULAR; INTRAVENOUS at 20:15

## 2024-06-13 RX ADMIN — MAGNESIUM SULFATE HEPTAHYDRATE 4 G: 40 INJECTION, SOLUTION INTRAVENOUS at 17:10

## 2024-06-13 RX ADMIN — LABETALOL HYDROCHLORIDE 80 MG: 5 INJECTION, SOLUTION INTRAVENOUS at 18:26

## 2024-06-13 RX ADMIN — HYDRALAZINE HYDROCHLORIDE 5 MG: 20 INJECTION INTRAMUSCULAR; INTRAVENOUS at 21:46

## 2024-06-13 NOTE — PROGRESS NOTES
Chief Complaint   Patient presents with    Routine Prenatal Visit     GBS today       HPI: Ynes is a  currently at 35w4d who today reports the following: She reports good fetal movement.   Contractions - No; Leaking - No; Vaginal bleeding -  No; Swelling of extremities - YES. Increased over the last few days.     ROS:  GI: Nausea - No; Constipation - No; Diarrhea - No    Neuro: Headache - No; Visual change - No      EXAM:  Vitals: See prenatal flowsheet   Abdomen: See prenatal flowsheet   Urine glucose/protein: See prenatal flowsheet   Pelvic: See prenatal flowsheet   MDM:   Impression: Supervision of low risk pregnancy  Elevated blood pressure in third trimester of pregnancy  Heartburn well controlled with pepcid    Tests done today: GBS testing   Topics discussed: Continue with PNV's  Prenatal labs reviewed  Discussed patient with Dr Lo, will have her go to labor and delivery for further evaluation.    Tests scheduled today for her next visit:   none

## 2024-06-13 NOTE — PROGRESS NOTES
23 y.o.  at 35w 4d   OB History          1    Para        Term                AB        Living             SAB        IAB        Ectopic        Molar        Multiple        Live Births                 Presents as an induction of labor due to preeclampsia with severe features and rigid cervix  Her primary OB requests a Fraga Bulb placement to initiate the induction of labor.    Fetal Heart Rate Assessment   Method: Fetal HR Assessment Method: external   Beats/min: Fetal HR (beats/min): 140   Baseline: Fetal HR Baseline: normal range   Varibility: Fetal HR Variability: moderate (amplitude range 6 to 25 bpm)   Accels: Fetal HR Accelerations: greater than/equal to 15 bpm, lasting at least 15 seconds   Decels: Fetal HR Decelerations: absent   Tracing Category:       TOCO  SVE closed/50%/-2 ultrasound Vertex in office.    A Fraga Bulb was placed without difficulties with 60 mL of sterile saline.  The patient tolerated the procedure well.

## 2024-06-14 PROBLEM — Z34.03 ENCOUNTER FOR SUPERVISION OF NORMAL FIRST PREGNANCY IN THIRD TRIMESTER: Status: RESOLVED | Noted: 2023-12-02 | Resolved: 2024-06-14

## 2024-06-14 PROBLEM — O26.899 HEARTBURN DURING PREGNANCY: Status: RESOLVED | Noted: 2024-04-11 | Resolved: 2024-06-14

## 2024-06-14 PROBLEM — R12 HEARTBURN DURING PREGNANCY: Status: RESOLVED | Noted: 2024-04-11 | Resolved: 2024-06-14

## 2024-06-14 PROBLEM — R82.5 POSITIVE URINE DRUG SCREEN: Status: RESOLVED | Noted: 2024-01-08 | Resolved: 2024-06-14

## 2024-06-14 PROCEDURE — 25010000002 ROPIVACAINE PER 1 MG: Performed by: NURSE ANESTHETIST, CERTIFIED REGISTERED

## 2024-06-14 PROCEDURE — 25010000002 HYDRALAZINE PER 20 MG: Performed by: OBSTETRICS & GYNECOLOGY

## 2024-06-14 PROCEDURE — 0KQM0ZZ REPAIR PERINEUM MUSCLE, OPEN APPROACH: ICD-10-PCS | Performed by: OBSTETRICS & GYNECOLOGY

## 2024-06-14 PROCEDURE — 25810000003 LACTATED RINGERS PER 1000 ML: Performed by: OBSTETRICS & GYNECOLOGY

## 2024-06-14 PROCEDURE — 25010000002 MAGNESIUM SULFATE 20 GM/500ML SOLUTION: Performed by: OBSTETRICS & GYNECOLOGY

## 2024-06-14 PROCEDURE — 94799 UNLISTED PULMONARY SVC/PX: CPT

## 2024-06-14 PROCEDURE — 25010000002 LABETALOL 5 MG/ML SOLUTION

## 2024-06-14 PROCEDURE — 59400 OBSTETRICAL CARE: CPT | Performed by: OBSTETRICS & GYNECOLOGY

## 2024-06-14 PROCEDURE — 0UQMXZZ REPAIR VULVA, EXTERNAL APPROACH: ICD-10-PCS | Performed by: OBSTETRICS & GYNECOLOGY

## 2024-06-14 PROCEDURE — 25010000002 LABETALOL 5 MG/ML SOLUTION: Performed by: OBSTETRICS & GYNECOLOGY

## 2024-06-14 PROCEDURE — 25010000002 CEFAZOLIN PER 500 MG: Performed by: OBSTETRICS & GYNECOLOGY

## 2024-06-14 PROCEDURE — 25010000002 ONDANSETRON PER 1 MG: Performed by: OBSTETRICS & GYNECOLOGY

## 2024-06-14 RX ORDER — IBUPROFEN 600 MG/1
600 TABLET ORAL EVERY 6 HOURS PRN
Status: DISCONTINUED | OUTPATIENT
Start: 2024-06-14 | End: 2024-06-16 | Stop reason: HOSPADM

## 2024-06-14 RX ORDER — ACETAMINOPHEN 325 MG/1
650 TABLET ORAL EVERY 6 HOURS PRN
Status: DISCONTINUED | OUTPATIENT
Start: 2024-06-14 | End: 2024-06-16 | Stop reason: HOSPADM

## 2024-06-14 RX ORDER — MISOPROSTOL 200 UG/1
600 TABLET ORAL ONCE AS NEEDED
Status: DISCONTINUED | OUTPATIENT
Start: 2024-06-14 | End: 2024-06-16 | Stop reason: HOSPADM

## 2024-06-14 RX ORDER — LABETALOL HYDROCHLORIDE 5 MG/ML
20-80 INJECTION, SOLUTION INTRAVENOUS
Status: DISCONTINUED | OUTPATIENT
Start: 2024-06-14 | End: 2024-06-14

## 2024-06-14 RX ORDER — HYDROCODONE BITARTRATE AND ACETAMINOPHEN 5; 325 MG/1; MG/1
1 TABLET ORAL EVERY 4 HOURS PRN
Status: DISCONTINUED | OUTPATIENT
Start: 2024-06-14 | End: 2024-06-16 | Stop reason: HOSPADM

## 2024-06-14 RX ORDER — PROMETHAZINE HYDROCHLORIDE 12.5 MG/1
12.5 SUPPOSITORY RECTAL EVERY 6 HOURS PRN
Status: DISCONTINUED | OUTPATIENT
Start: 2024-06-14 | End: 2024-06-16

## 2024-06-14 RX ORDER — HYDRALAZINE HYDROCHLORIDE 20 MG/ML
10 INJECTION INTRAMUSCULAR; INTRAVENOUS EVERY 4 HOURS PRN
Status: DISCONTINUED | OUTPATIENT
Start: 2024-06-14 | End: 2024-06-16 | Stop reason: HOSPADM

## 2024-06-14 RX ORDER — LIDOCAINE HCL/EPINEPHRINE/PF 2%-1:200K
VIAL (ML) INJECTION AS NEEDED
Status: DISCONTINUED | OUTPATIENT
Start: 2024-06-14 | End: 2024-06-14 | Stop reason: SURG

## 2024-06-14 RX ORDER — SODIUM CHLORIDE, SODIUM LACTATE, POTASSIUM CHLORIDE, CALCIUM CHLORIDE 600; 310; 30; 20 MG/100ML; MG/100ML; MG/100ML; MG/100ML
75 INJECTION, SOLUTION INTRAVENOUS CONTINUOUS
Status: DISCONTINUED | OUTPATIENT
Start: 2024-06-14 | End: 2024-06-16 | Stop reason: HOSPADM

## 2024-06-14 RX ORDER — LABETALOL HYDROCHLORIDE 5 MG/ML
INJECTION, SOLUTION INTRAVENOUS
Status: COMPLETED
Start: 2024-06-14 | End: 2024-06-14

## 2024-06-14 RX ORDER — CARBOPROST TROMETHAMINE 250 UG/ML
250 INJECTION, SOLUTION INTRAMUSCULAR ONCE AS NEEDED
Status: DISCONTINUED | OUTPATIENT
Start: 2024-06-14 | End: 2024-06-16 | Stop reason: HOSPADM

## 2024-06-14 RX ORDER — LABETALOL HYDROCHLORIDE 5 MG/ML
20-80 INJECTION, SOLUTION INTRAVENOUS
Status: DISCONTINUED | OUTPATIENT
Start: 2024-06-14 | End: 2024-06-16 | Stop reason: HOSPADM

## 2024-06-14 RX ORDER — OXYTOCIN/0.9 % SODIUM CHLORIDE 30/500 ML
999 PLASTIC BAG, INJECTION (ML) INTRAVENOUS ONCE
Status: DISCONTINUED | OUTPATIENT
Start: 2024-06-14 | End: 2024-06-16

## 2024-06-14 RX ORDER — PROMETHAZINE HYDROCHLORIDE 12.5 MG/1
12.5 TABLET ORAL EVERY 6 HOURS PRN
Status: DISCONTINUED | OUTPATIENT
Start: 2024-06-14 | End: 2024-06-16

## 2024-06-14 RX ORDER — HYDROCORTISONE 25 MG/G
1 CREAM TOPICAL AS NEEDED
Status: DISCONTINUED | OUTPATIENT
Start: 2024-06-14 | End: 2024-06-16 | Stop reason: HOSPADM

## 2024-06-14 RX ORDER — OXYTOCIN/0.9 % SODIUM CHLORIDE 30/500 ML
250 PLASTIC BAG, INJECTION (ML) INTRAVENOUS CONTINUOUS
Status: DISCONTINUED | OUTPATIENT
Start: 2024-06-14 | End: 2024-06-14

## 2024-06-14 RX ORDER — METHYLERGONOVINE MALEATE 0.2 MG/ML
200 INJECTION INTRAVENOUS ONCE AS NEEDED
Status: DISCONTINUED | OUTPATIENT
Start: 2024-06-14 | End: 2024-06-14 | Stop reason: SDUPTHER

## 2024-06-14 RX ORDER — CARBOPROST TROMETHAMINE 250 UG/ML
250 INJECTION, SOLUTION INTRAMUSCULAR AS NEEDED
Status: DISCONTINUED | OUTPATIENT
Start: 2024-06-14 | End: 2024-06-14 | Stop reason: SDUPTHER

## 2024-06-14 RX ORDER — MAGNESIUM SULFATE HEPTAHYDRATE 40 MG/ML
2 INJECTION, SOLUTION INTRAVENOUS CONTINUOUS
Status: DISCONTINUED | OUTPATIENT
Start: 2024-06-14 | End: 2024-06-16 | Stop reason: HOSPADM

## 2024-06-14 RX ORDER — DOCUSATE SODIUM 100 MG/1
100 CAPSULE, LIQUID FILLED ORAL 2 TIMES DAILY
Status: DISCONTINUED | OUTPATIENT
Start: 2024-06-14 | End: 2024-06-16 | Stop reason: HOSPADM

## 2024-06-14 RX ORDER — MORPHINE SULFATE 2 MG/ML
2 INJECTION, SOLUTION INTRAMUSCULAR; INTRAVENOUS
Status: DISCONTINUED | OUTPATIENT
Start: 2024-06-14 | End: 2024-06-15

## 2024-06-14 RX ORDER — LABETALOL 200 MG/1
200 TABLET, FILM COATED ORAL EVERY 8 HOURS SCHEDULED
Status: DISCONTINUED | OUTPATIENT
Start: 2024-06-14 | End: 2024-06-15

## 2024-06-14 RX ORDER — MISOPROSTOL 200 UG/1
800 TABLET ORAL AS NEEDED
Status: DISCONTINUED | OUTPATIENT
Start: 2024-06-14 | End: 2024-06-14 | Stop reason: SDUPTHER

## 2024-06-14 RX ORDER — OXYCODONE HYDROCHLORIDE AND ACETAMINOPHEN 5; 325 MG/1; MG/1
1 TABLET ORAL EVERY 4 HOURS PRN
Status: DISCONTINUED | OUTPATIENT
Start: 2024-06-14 | End: 2024-06-16

## 2024-06-14 RX ORDER — BISACODYL 10 MG
10 SUPPOSITORY, RECTAL RECTAL DAILY PRN
Status: DISCONTINUED | OUTPATIENT
Start: 2024-06-15 | End: 2024-06-16 | Stop reason: HOSPADM

## 2024-06-14 RX ORDER — ROPIVACAINE HYDROCHLORIDE 2 MG/ML
15 INJECTION, SOLUTION EPIDURAL; INFILTRATION; PERINEURAL CONTINUOUS
Status: DISCONTINUED | OUTPATIENT
Start: 2024-06-14 | End: 2024-06-14

## 2024-06-14 RX ORDER — IBUPROFEN 600 MG/1
600 TABLET ORAL EVERY 6 HOURS PRN
Status: DISCONTINUED | OUTPATIENT
Start: 2024-06-14 | End: 2024-06-14 | Stop reason: SDUPTHER

## 2024-06-14 RX ORDER — METHYLERGONOVINE MALEATE 0.2 MG/ML
200 INJECTION INTRAVENOUS ONCE AS NEEDED
Status: DISCONTINUED | OUTPATIENT
Start: 2024-06-14 | End: 2024-06-16 | Stop reason: HOSPADM

## 2024-06-14 RX ORDER — HYDROCODONE BITARTRATE AND ACETAMINOPHEN 10; 325 MG/1; MG/1
1 TABLET ORAL EVERY 4 HOURS PRN
Status: DISCONTINUED | OUTPATIENT
Start: 2024-06-14 | End: 2024-06-16 | Stop reason: HOSPADM

## 2024-06-14 RX ORDER — ONDANSETRON 2 MG/ML
4 INJECTION INTRAMUSCULAR; INTRAVENOUS EVERY 6 HOURS PRN
Status: DISCONTINUED | OUTPATIENT
Start: 2024-06-14 | End: 2024-06-16

## 2024-06-14 RX ORDER — OXYTOCIN/0.9 % SODIUM CHLORIDE 30/500 ML
125 PLASTIC BAG, INJECTION (ML) INTRAVENOUS ONCE AS NEEDED
Status: DISCONTINUED | OUTPATIENT
Start: 2024-06-14 | End: 2024-06-16 | Stop reason: HOSPADM

## 2024-06-14 RX ORDER — ONDANSETRON 4 MG/1
4 TABLET, ORALLY DISINTEGRATING ORAL EVERY 6 HOURS PRN
Status: DISCONTINUED | OUTPATIENT
Start: 2024-06-14 | End: 2024-06-16

## 2024-06-14 RX ADMIN — EPHEDRINE SULFATE 10 MG: 5 INJECTION INTRAVENOUS at 01:19

## 2024-06-14 RX ADMIN — WITCH HAZEL: 500 SOLUTION RECTAL; TOPICAL at 18:35

## 2024-06-14 RX ADMIN — FAMOTIDINE 20 MG: 10 INJECTION, SOLUTION INTRAVENOUS at 06:25

## 2024-06-14 RX ADMIN — MAGNESIUM SULFATE HEPTAHYDRATE 2 G/HR: 40 INJECTION, SOLUTION INTRAVENOUS at 12:51

## 2024-06-14 RX ADMIN — ROPIVACAINE HYDROCHLORIDE 15 ML/HR: 2 INJECTION, SOLUTION EPIDURAL; INFILTRATION at 12:50

## 2024-06-14 RX ADMIN — LABETALOL HYDROCHLORIDE 200 MG: 200 TABLET, FILM COATED ORAL at 22:03

## 2024-06-14 RX ADMIN — EPHEDRINE SULFATE 10 MG: 5 INJECTION INTRAVENOUS at 02:22

## 2024-06-14 RX ADMIN — SODIUM CHLORIDE, POTASSIUM CHLORIDE, SODIUM LACTATE AND CALCIUM CHLORIDE 75 ML/HR: 600; 310; 30; 20 INJECTION, SOLUTION INTRAVENOUS at 00:58

## 2024-06-14 RX ADMIN — LIDOCAINE HYDROCHLORIDE AND EPINEPHRINE 7 ML: 20; 5 INJECTION, SOLUTION EPIDURAL; INFILTRATION; INTRACAUDAL; PERINEURAL at 11:20

## 2024-06-14 RX ADMIN — LABETALOL HYDROCHLORIDE 20 MG: 5 INJECTION, SOLUTION INTRAVENOUS at 21:05

## 2024-06-14 RX ADMIN — MAGNESIUM SULFATE HEPTAHYDRATE 2 G/HR: 40 INJECTION, SOLUTION INTRAVENOUS at 02:23

## 2024-06-14 RX ADMIN — HYDRALAZINE HYDROCHLORIDE 10 MG: 20 INJECTION INTRAMUSCULAR; INTRAVENOUS at 21:59

## 2024-06-14 RX ADMIN — MAGNESIUM SULFATE HEPTAHYDRATE 2 G/HR: 40 INJECTION, SOLUTION INTRAVENOUS at 23:06

## 2024-06-14 RX ADMIN — DOCUSATE SODIUM 100 MG: 100 CAPSULE, LIQUID FILLED ORAL at 21:06

## 2024-06-14 RX ADMIN — Medication 2 MILLI-UNITS/MIN: at 08:54

## 2024-06-14 RX ADMIN — SODIUM CHLORIDE 2000 MG: 900 INJECTION INTRAVENOUS at 10:36

## 2024-06-14 RX ADMIN — LABETALOL HYDROCHLORIDE 100 MG: 200 TABLET, FILM COATED ORAL at 06:20

## 2024-06-14 RX ADMIN — ONDANSETRON 4 MG: 2 INJECTION INTRAMUSCULAR; INTRAVENOUS at 07:31

## 2024-06-14 RX ADMIN — Medication: at 18:34

## 2024-06-14 RX ADMIN — SODIUM CHLORIDE 2000 MG: 900 INJECTION INTRAVENOUS at 02:23

## 2024-06-14 RX ADMIN — LABETALOL HYDROCHLORIDE 20 MG: 5 INJECTION, SOLUTION INTRAVENOUS at 19:21

## 2024-06-14 RX ADMIN — ACETAMINOPHEN 650 MG: 325 TABLET ORAL at 10:35

## 2024-06-14 RX ADMIN — IBUPROFEN 600 MG: 600 TABLET ORAL at 17:13

## 2024-06-14 RX ADMIN — MINERAL OIL 30 ML: 1000 SOLUTION ORAL at 13:24

## 2024-06-14 NOTE — PAYOR COMM NOTE
"Ynes Mcdaniel (23 y.o. Female)       Date of Birth   2000    Social Security Number       Address   [address] [place] [address] [address] [place] [address]    Home Phone   [phone]    MRN   [id number]       Anglican   None    Marital Status   Single                            Admission Date   24    Admission Type   Elective    Admitting Provider   Anatoly Lo MD    Attending Provider   Anatoly Lo MD    Department, Room/Bed   [facility] LABOR DELIVERY, N315/1       Discharge Date       Discharge Disposition       Discharge Destination                                 Attending Provider: Anatoly Lo MD    Allergies: Amoxicillin-pot Clavulanate    Isolation: None   Infection: None   Code Status: CPR    Ht: 161.3 cm (63.5\")   Wt: 82.6 kg (182 lb)    Admission Cmt: None   Principal Problem: Postpartum care following  2024 - Marty Wu [Z39.2]                   Active Insurance as of 2024       Primary Coverage       Payor Plan Insurance Group Employer/Plan Group    [facility] [id number]       Payor Plan Address Payor Plan Phone Number Payor Plan Fax Number Effective Dates    [address]   2023 - None Entered    [facility] [id number]         Subscriber Name Subscriber Birth Date Member ID       MAYA MCDANIEL 1982 [id number]               Secondary Coverage       Payor Plan Insurance Group Employer/Plan Group    [employer] [place] [employer]        Payor Plan Address Payor Plan Phone Number Payor Plan Fax Number Effective Dates    [address] [address] [phone]  2024 - None Entered    [facility] [id number]         Subscriber Name Subscriber Birth Date Member ID       YNES MCDANIEL 2000 [id number]                     Emergency Contacts        (Rel.) Home Phone Work Phone Mobile Phone    Dawson Mcdaniel (Father) -- -- [phone]    SPENCER SOMMER (Mother) -- -- [phone]              Insurance Information           "        [employer] Phone: --    Subscriber: Bunny Mcdaniel Subscriber#: [id number]    Group#: [id number] Precert#: --        [facility] MEDICAID Phone: [phone]    Subscriber: Ynes Mcdaniel Subscriber#: [id number]    Group#: -- Precert#: --             History & Physical        Anatoly Lo MD at 24 [address] [place] [address]  Ynes Mcdaniel  : 2000  MRN: [id number]  CSN: [id number]    History and Physical    Subjective  Chief Complaint   Patient presents with    Elevated Blood Pressure     Ynes Mcdaniel is a 23 y.o. year old  with an Estimated Date of Delivery: 24 currently at 35w4d seen today in the office for routine prenatal visit.  Blood pressure was markedly elevated and she had 3+ protein.  She was sent to labor and delivery to be evaluated and induced for the diagnosis of severe preeclampsia.    Prenatal care has been with Dr. Lo.  It has been otherwise benign.    OB History    Para Term  AB Living   1 0 0 0 0 0   SAB IAB Ectopic Molar Multiple Live Births   0 0 0 0 0 0      # Outcome Date GA Lbr Nathan/2nd Weight Sex Type Anes PTL Lv   1 Current                Past Medical History:   Diagnosis Date    HSP (Henoch-Schonlein purpura) nephritis        Past Surgical History:   Procedure Laterality Date    RENAL BIOPSY Right 2013    Dx with HSP nephritis       Medications Prior to Admission   Medication Sig Dispense Refill Last Dose    famotidine (Pepcid) 20 MG tablet Take 1 tablet by mouth 2 (Two) Times a Day As Needed for Heartburn.   2024 at 0930    prenatal vitamin (prenatal, CLASSIC, vitamin) tablet Take 1 tablet by mouth Daily.   Past Week at 1200       Allergies   Allergen Reactions    Amoxicillin-Pot Clavulanate Rash     Social History    Tobacco Use      Smoking status: Never      Smokeless tobacco: Never    Review of Systems      Objective  /85 (BP Location: Left arm, Patient Position:  "Lying)   Pulse 75   Temp 98.1 °F (36.7 °C) (Oral)   Resp 16   Ht 161.3 cm (63.5\")   Wt 82.6 kg (182 lb)   LMP  (LMP Unknown)   SpO2 99%   BMI 31.73 kg/m²   General: well developed; well nourished  no acute distress   Abdomen: soft, non-tender; no masses  no umbilical or inguinal hernias are present  no hepato-splenomegaly   FHT's: reassuring and category 1      Cervix: was not checked.   Presentation: cephalic   Contractions: none     Prenatal Labs  Lab Results   Component Value Date    HGB 11.5 (L) 2024    RUBELLAABIGG 4.57 2023    HEPBSAG Non-Reactive 2023    ABSCRN Negative 2024    YJU4HIW5 Non-Reactive 2023    HEPCVIRUSABY Non-Reactive 2023    GCT 79 2024    URINECX 25,000 CFU/mL Lactobacillus species (A) 2024    CHLAMNAA Negative 2024    NGONORRHON Negative 2024       Current Labs Reviewed   CBC:   Lab Results   Component Value Date    WBC 8.79 2024    HGB 11.5 (L) 2024    HCT 34.7 2024     2024     Pre-eclampsia Panel:   Lab Results   Component Value Date    ALKPHOS 113 2024    AST 14 2024    ALT 9 2024    BILITOT <0.2 2024     (H) 2024    URICACID 5.6 2024    CREATININE 0.85 2024          Assessment  IUP at 35w4d  Preeclampsia with severe features  Unfavorable cervix     Plan  Admit  Magnesium sulfate for seizure prophylaxis  Begin induction with cervical Fraga along with oral Cytotec  Pitocin to begin in morning if not already in labor  Antibiotics empirically for group B strep unknown    Anatoly Lo MD  2024  20:09 EDT       Electronically signed by Anatoly Lo MD at 24          Physician Progress Notes (last 48 hours)        Anatoly Lo MD at 24 1022           Yi Vasquezen  : 2000  MRN: [id number]  CSN: [id number]    Labor progress note    Subjective     CC: hospital follow-up    She is " having no problems.    Objective   Min/max vitals past 24 hours:  Temp  Min: 97.8 °F (36.6 °C)  Max: 98.5 °F (36.9 °C)   BP  Min: 108/58  Max: 177/102   Pulse  Min: 57  Max: 101   Resp  Min: 16  Max: 18        FHT's: reassuring and category 1   Cervix: was checked (by me): 7 cm / 100 % / 0   Contractions: regular       Assessment   IUP at 35w5d  Progressing in labor  Fetal status reassuring    Plan   Continue with induction    Anatoyl Lo MD  2024  10:22 EDT           Electronically signed by Anatoly Lo MD at 24 1022       Anatoly Lo MD at 24 0813           [place]  Ynes Mcdaniel  : 2000  MRN: [id number]  CSN: [id number]    Labor progress note    Subjective     CC: hospital follow-up    She is having no problems and is comfortable with the epidural.  Had amniotomy last evening Pitocin has not yet been started.      Objective   Min/max vitals past 24 hours:  Temp  Min: 98.1 °F (36.7 °C)  Max: 98.5 °F (36.9 °C)   BP  Min: 108/58  Max: 177/102   Pulse  Min: 57  Max: 101   Resp  Min: 16  Max: 18        FHT's: reassuring and category 1   Cervix: was not checked.   Contractions: every 3-7 minutes       Assessment   IUP at 35w5d  Induction of labor for severe gestational hypertension on magnesium sulfate  Fetal status reassuring    Plan   Okay to begin Pitocin    Anatoly Lo MD  2024  08:13 EDT           Electronically signed by Anatoly Lo MD at 24 0814       Joby Lewis DO at 24 1852          23 y.o.  at 35w 4d   OB History          1    Para        Term                AB        Living             SAB        IAB        Ectopic        Molar        Multiple        Live Births                 Presents as an induction of labor due to preeclampsia with severe features and rigid cervix  Her primary OB requests a Fraga Bulb placement to initiate the induction of labor.    Fetal Heart Rate Assessment    Method: Fetal HR Assessment Method: external   Beats/min: Fetal HR (beats/min): 140   Baseline: Fetal HR Baseline: normal range   Varibility: Fetal HR Variability: moderate (amplitude range 6 to 25 bpm)   Accels: Fetal HR Accelerations: greater than/equal to 15 bpm, lasting at least 15 seconds   Decels: Fetal HR Decelerations: absent   Tracing Category:       TOCO  SVE closed/50%/-2 ultrasound Vertex in office.    A Fraga Bulb was placed without difficulties with 60 mL of sterile saline.  The patient tolerated the procedure well.    Electronically signed by Joby Lewis DO at 06/13/24 7832

## 2024-06-14 NOTE — ANESTHESIA PROCEDURE NOTES
Labor Epidural      Patient reassessed immediately prior to procedure    Patient location during procedure: OB  Start Time: 6/13/2024 10:20 PM  Stop Time: 6/13/2024 10:43 PM  Performed By  Anesthesiologist: Flaco Fuller MD  Preanesthetic Checklist  Completed: patient identified, IV checked, site marked, risks and benefits discussed, surgical consent, monitors and equipment checked, pre-op evaluation and timeout performed  Additional Notes  dep  Prep:  Pt Position:sitting  Sterile Tech:cap, gloves, mask and sterile barrier  Prep:povidone-iodine 7.5% surgical scrub  Monitoring:blood pressure monitoring  Epidural Block Procedure:  Approach:midline  Guidance:landmark technique  Location:L3-L4  Needle Type:Tuohy  Needle Gauge:17 G  Loss of Resistance Medium: air  Loss of Resistance: 5cm  Cath Depth at skin:15 cm  Paresthesia: none  Aspiration:negative  Test Dose:negative  Number of Attempts: 1  Post Assessment:  Dressing:occlusive dressing applied and secured with tape  Pt Tolerance:patient tolerated the procedure well with no apparent complications  Complications:no

## 2024-06-14 NOTE — PROGRESS NOTES
ALTAGRACIA [place]  Ynes Mcdaniel  : 2000  MRN: [id number]  CSN: [id number]    Labor progress note    Subjective     CC: hospital follow-up    She is having no problems and is comfortable with the epidural.  Had amniotomy last evening Pitocin has not yet been started.       Objective   Min/max vitals past 24 hours:  Temp  Min: 98.1 °F (36.7 °C)  Max: 98.5 °F (36.9 °C)   BP  Min: 108/58  Max: 177/102   Pulse  Min: 57  Max: 101   Resp  Min: 16  Max: 18        FHT's: reassuring and category 1   Cervix: was not checked.   Contractions: every 3-7 minutes        Assessment   IUP at 35w5d  Induction of labor for severe gestational hypertension on magnesium sulfate  Fetal status reassuring     Plan   Okay to begin Pitocin    Anatoly Lo MD  2024  08:13 EDT

## 2024-06-14 NOTE — L&D DELIVERY NOTE
[facility]  Vaginal Delivery Note    Delivery details     Delivery: Vaginal, Spontaneous     YOB: 2023    Time of Birth: 1:45 PM      Anesthesia: Epidural     Delivering clinician: Anatoly Lo    Forceps?   No   Vacuum? No    Hospital Problem list: Postpartum care following  2024 - Marty Wu  Severe preeclampsia, third trimeste   Shoulder dystocia present: No      Delivery narrative: Spontaneous delivery from occiput anterior.  Mouth and nose bulb suction on perineum and again after delivery.  After 30 seconds of delay, cord clamped and placed on maternal abdomen.  After obtaining cord blood and cord segment placenta delivered spontaneous and intact with normal three-vessel cord.  Repair of right periurethral laceration with interrupted 3-0 chromic.  Repair of second-degree midline perineal laceration with 3-0 chromic.    Infant details    Findings: male  infant     Infant observations: Weight: 1965 g (4 lb 5.3 oz)   Length: 18.75  in   Apgars: 8  @ 1 minute /    9  @ 5 minutes     Placenta, Cord, and Fluid    Placenta delivered  Spontaneous  at   2024  1:49 PM     Cord: 3 vessels  present.   Nuchal Cord?  no   Cord blood obtained: Yes      Repair    Episiotomy: Not recorded    Estimated Blood Loss: Est. Blood Loss (mL): 200 mL (Filed from Delivery Summary) (24 1345)       Complications  none    Disposition  Mother to Mother Baby/Postpartum  in stable condition currently.  Baby to NBN  in stable condition currently.      Anatoly Lo MD  24  14:15 EDT

## 2024-06-14 NOTE — ANESTHESIA PREPROCEDURE EVALUATION
Anesthesia Evaluation     Patient summary reviewed and Nursing notes reviewed                Airway   Mallampati: I  TM distance: >3 FB  Neck ROM: full  No difficulty expected  Dental - normal exam     Pulmonary - negative pulmonary ROS and normal exam   Cardiovascular - negative cardio ROS and normal exam        Neuro/Psych- negative ROS  GI/Hepatic/Renal/Endo    (+) renal disease-    Musculoskeletal (-) negative ROS    Abdominal  - normal exam    Bowel sounds: normal.   Substance History - negative use     OB/GYN    (+) Pregnant, Preeclampsia, pregnancy induced hypertension        Other                    Anesthesia Plan    ASA 2 - emergent     epidural       Anesthetic plan, risks, benefits, and alternatives have been provided, discussed and informed consent has been obtained with: patient.    Use of blood products discussed with patient .    Plan discussed with attending.    CODE STATUS:    Code Status (Patient has no pulse and is not breathing): CPR (Attempt to Resuscitate)  Medical Interventions (Patient has pulse or is breathing): Full Support

## 2024-06-14 NOTE — PROGRESS NOTES
ALTAGRACIA [place]  Ynes Mcdaniel  : 2000  MRN: [id number]  CSN: [id number]    Labor progress note    Subjective     CC: hospital follow-up    She is having no problems.     Objective   Min/max vitals past 24 hours:  Temp  Min: 97.8 °F (36.6 °C)  Max: 98.5 °F (36.9 °C)   BP  Min: 108/58  Max: 177/102   Pulse  Min: 57  Max: 101   Resp  Min: 16  Max: 18        FHT's: reassuring and category 1   Cervix: was checked (by me): 7 cm / 100 % / 0   Contractions: regular        Assessment   IUP at 35w5d  Progressing in labor  Fetal status reassuring     Plan   Continue with induction    Anatoly Lo MD  2024  10:22 EDT

## 2024-06-14 NOTE — H&P
[facility]  Ynes Mcdaniel  : 2000  MRN: [id number]  CSN: [id number]    History and Physical    Subjective   Chief Complaint   Patient presents with    Elevated Blood Pressure     Ynes Mcdaniel is a 23 y.o. year old  with an Estimated Date of Delivery: 24 currently at 35w4d seen today in the office for routine prenatal visit.  Blood pressure was markedly elevated and she had 3+ protein.  She was sent to labor and delivery to be evaluated and induced for the diagnosis of severe preeclampsia.    Prenatal care has been with Dr. Lo.  It has been otherwise benign.    OB History    Para Term  AB Living   1 0 0 0 0 0   SAB IAB Ectopic Molar Multiple Live Births   0 0 0 0 0 0      # Outcome Date GA Lbr Nathan/2nd Weight Sex Type Anes PTL Lv   1 Current                Past Medical History:   Diagnosis Date    HSP (Henoch-Schonlein purpura) nephritis        Past Surgical History:   Procedure Laterality Date    RENAL BIOPSY Right 2013    Dx with HSP nephritis       Medications Prior to Admission   Medication Sig Dispense Refill Last Dose    famotidine (Pepcid) 20 MG tablet Take 1 tablet by mouth 2 (Two) Times a Day As Needed for Heartburn.   2024 at 0930    prenatal vitamin (prenatal, CLASSIC, vitamin) tablet Take 1 tablet by mouth Daily.   Past Week at 1200       Allergies   Allergen Reactions    Amoxicillin-Pot Clavulanate Rash     Social History    Tobacco Use      Smoking status: Never      Smokeless tobacco: Never    Review of Systems   Constitutional:  Negative for unexpected weight change.   Respiratory:  Negative for cough and shortness of breath.    Cardiovascular:  Negative for chest pain.   Gastrointestinal:  Negative for abdominal distention, constipation and diarrhea.        No persistent bloating   Genitourinary:  Negative for difficulty urinating, dysuria, hematuria and urgency.        No significant incontinence   Skin:  Negative for rash.   Neurological:   "Negative for headaches.         Objective   /85 (BP Location: Left arm, Patient Position: Lying)   Pulse 75   Temp 98.1 °F (36.7 °C) (Oral)   Resp 16   Ht 161.3 cm (63.5\")   Wt 82.6 kg (182 lb)   LMP  (LMP Unknown)   SpO2 99%   BMI 31.73 kg/m²   General: well developed; well nourished  no acute distress   Abdomen: soft, non-tender; no masses  no umbilical or inguinal hernias are present  no hepato-splenomegaly   FHT's: reassuring and category 1      Cervix: was not checked.   Presentation: cephalic   Contractions: none     Prenatal Labs  Lab Results   Component Value Date    HGB 11.5 (L) 06/13/2024    RUBELLAABIGG 4.57 12/07/2023    HEPBSAG Non-Reactive 12/07/2023    ABSCRN Negative 06/13/2024    FSW0HTA6 Non-Reactive 12/07/2023    HEPCVIRUSABY Non-Reactive 12/07/2023    GCT 79 04/29/2024    URINECX 25,000 CFU/mL Lactobacillus species (A) 01/08/2024    CHLAMNAA Negative 01/08/2024    NGONORRHON Negative 01/08/2024       Current Labs Reviewed   CBC:   Lab Results   Component Value Date    WBC 8.79 06/13/2024    HGB 11.5 (L) 06/13/2024    HCT 34.7 06/13/2024     06/13/2024     Pre-eclampsia Panel:   Lab Results   Component Value Date    ALKPHOS 113 06/13/2024    AST 14 06/13/2024    ALT 9 06/13/2024    BILITOT <0.2 06/13/2024     (H) 06/13/2024    URICACID 5.6 06/13/2024    CREATININE 0.85 06/13/2024          Assessment   IUP at 35w4d  Preeclampsia with severe features  Unfavorable cervix     Plan   Admit  Magnesium sulfate for seizure prophylaxis  Begin induction with cervical Fraga along with oral Cytotec  Pitocin to begin in morning if not already in labor  Antibiotics empirically for group B strep unknown    Anatoly Lo MD  6/13/2024  20:09 EDT     "

## 2024-06-15 LAB
HCT VFR BLD AUTO: 33.3 % (ref 34–46.6)
HGB BLD-MCNC: 11.1 G/DL (ref 12–15.9)

## 2024-06-15 PROCEDURE — 85018 HEMOGLOBIN: CPT | Performed by: OBSTETRICS & GYNECOLOGY

## 2024-06-15 PROCEDURE — 85014 HEMATOCRIT: CPT | Performed by: OBSTETRICS & GYNECOLOGY

## 2024-06-15 PROCEDURE — 25010000002 MAGNESIUM SULFATE 20 GM/500ML SOLUTION: Performed by: OBSTETRICS & GYNECOLOGY

## 2024-06-15 PROCEDURE — 25810000003 LACTATED RINGERS PER 1000 ML: Performed by: OBSTETRICS & GYNECOLOGY

## 2024-06-15 RX ORDER — FAMOTIDINE 20 MG/1
40 TABLET, FILM COATED ORAL
Status: DISCONTINUED | OUTPATIENT
Start: 2024-06-15 | End: 2024-06-16 | Stop reason: HOSPADM

## 2024-06-15 RX ORDER — LABETALOL 200 MG/1
300 TABLET, FILM COATED ORAL EVERY 8 HOURS SCHEDULED
Status: DISCONTINUED | OUTPATIENT
Start: 2024-06-15 | End: 2024-06-16 | Stop reason: HOSPADM

## 2024-06-15 RX ADMIN — LABETALOL HYDROCHLORIDE 300 MG: 200 TABLET, FILM COATED ORAL at 13:46

## 2024-06-15 RX ADMIN — SODIUM CHLORIDE, POTASSIUM CHLORIDE, SODIUM LACTATE AND CALCIUM CHLORIDE 75 ML/HR: 600; 310; 30; 20 INJECTION, SOLUTION INTRAVENOUS at 04:32

## 2024-06-15 RX ADMIN — DOCUSATE SODIUM 100 MG: 100 CAPSULE, LIQUID FILLED ORAL at 08:10

## 2024-06-15 RX ADMIN — LABETALOL HYDROCHLORIDE 300 MG: 200 TABLET, FILM COATED ORAL at 21:59

## 2024-06-15 RX ADMIN — MAGNESIUM SULFATE HEPTAHYDRATE 2 G/HR: 40 INJECTION, SOLUTION INTRAVENOUS at 10:58

## 2024-06-15 RX ADMIN — ACETAMINOPHEN 650 MG: 325 TABLET ORAL at 04:03

## 2024-06-15 RX ADMIN — DOCUSATE SODIUM 100 MG: 100 CAPSULE, LIQUID FILLED ORAL at 20:22

## 2024-06-15 RX ADMIN — IBUPROFEN 600 MG: 600 TABLET ORAL at 08:10

## 2024-06-15 RX ADMIN — LABETALOL HYDROCHLORIDE 200 MG: 200 TABLET, FILM COATED ORAL at 06:38

## 2024-06-15 RX ADMIN — FAMOTIDINE 40 MG: 20 TABLET, FILM COATED ORAL at 21:22

## 2024-06-15 NOTE — PROGRESS NOTES
6/15/2024    Name:Ynes Mcdaniel    MR#:[id number]     PROGRESS NOTE:  Post-Partum Day 1 S/P  Preeclampsia   HD:2    Subjective   23 y.o. yo Female  s/p  at 35w5d doing well. Pain well controlled. Tolerating regular diet and having flatus. Lochia normal.  Denies HA, RUQ pain or epigastric pain.        Postpartum care following  2024 - Marty Bryce    Severe preeclampsia, third trimester        Objective    Vitals  Temp:  Temp:  [97.7 °F (36.5 °C)-98.5 °F (36.9 °C)] 97.7 °F (36.5 °C)  Temp src: Oral  BP:  BP: (106-169)/() 119/69  Pulse:  Heart Rate:  [] 78  RR:   Resp:  [16-20] 18    General Awake, alert, no distress  Abdomen Soft, non-distended, fundus firm, below umbilicus, appropriately tender  Extremities Calves NT bilaterally     I/O last 3 completed shifts:  In: 5572.4 [P.O.:1000; I.V.:4472.4; IV Piggyback:100]  Out: 7125 [Urine:6925; Blood:200]    LABS:   Lab Results   Component Value Date    WBC 8.79 2024    HGB 11.1 (L) 06/15/2024    HCT 33.3 (L) 06/15/2024    MCV 90.6 2024     2024       Infant: male       Assessment   1.  PPD 1   2.severe pre-eclampsia    Plan: Doing well.  Routine postoperative care            Hemodynamically Stable            Discontinue Magnesium  Post 24 hours and will increase PO antihypertensive therapy.            Requiring antihypertensives yes            Breastfeeding      Karin Cohn MD  6/15/2024 11:50 EDT

## 2024-06-15 NOTE — PLAN OF CARE
Goal Outcome Evaluation:  Plan of Care Reviewed With: patient        Progress: improving  Outcome Evaluation: VSS, lochia WDL, pain well controlled, patient visiting infant in NICU; Patient denies headache, changes in vision, and RUQ pain.

## 2024-06-15 NOTE — LACTATION NOTE
06/15/24 0830   Maternal Information   Date of Referral 06/14/24   Person Making Referral physician   Maternal Reason for Referral no prior breastfeeding experience;separation from infant   Infant Reason for Referral 35-37 weeks gestation;NICU admission   Maternal Assessment   Breast Size Issue none   Breast Shape Bilateral:;round   Breast Density Bilateral:;soft   Nipples Bilateral:;short   Left Nipple Symptoms intact;nontender   Right Nipple Symptoms intact;nontender   Maternal Infant Feeding   Maternal Emotional State receptive;relaxed   Support Person Involvement actively supporting mother   Milk Expression/Equipment   Breast Pump Type double electric, hospital grade;other (see comments)  (Mom will need a new Spectra pump when she comes to post partum floor.)   Breast Pump Flange Size 21 mm   Breast Pumping   Breast Pumping Interventions post-feed pumping encouraged;frequent pumping encouraged  (Mom encouraged to pump every 3 hours.)   Breast Pumping double electric breast pump utilized     Pumping was initiated with a hospital pump because baby was admitted to the NICU.  Parents were given the NICU educational breastfeeding packet.  Pump operation, pump cleaning, and safe milk handling were discussed.  Mom was advised to pump every 3 hours.

## 2024-06-16 VITALS
OXYGEN SATURATION: 97 % | WEIGHT: 182 LBS | RESPIRATION RATE: 16 BRPM | TEMPERATURE: 98.8 F | DIASTOLIC BLOOD PRESSURE: 76 MMHG | HEART RATE: 62 BPM | SYSTOLIC BLOOD PRESSURE: 133 MMHG | HEIGHT: 64 IN | BODY MASS INDEX: 31.07 KG/M2

## 2024-06-16 RX ORDER — LABETALOL 300 MG/1
300 TABLET, FILM COATED ORAL EVERY 8 HOURS SCHEDULED
Qty: 90 TABLET | Refills: 0 | Status: SHIPPED | OUTPATIENT
Start: 2024-06-16 | End: 2024-06-20

## 2024-06-16 RX ORDER — IBUPROFEN 600 MG/1
600 TABLET ORAL EVERY 6 HOURS PRN
Qty: 30 TABLET | Refills: 0 | Status: SHIPPED | OUTPATIENT
Start: 2024-06-16 | End: 2024-06-20

## 2024-06-16 RX ADMIN — DOCUSATE SODIUM 100 MG: 100 CAPSULE, LIQUID FILLED ORAL at 08:15

## 2024-06-16 RX ADMIN — FAMOTIDINE 40 MG: 20 TABLET, FILM COATED ORAL at 08:15

## 2024-06-16 RX ADMIN — LABETALOL HYDROCHLORIDE 300 MG: 200 TABLET, FILM COATED ORAL at 05:57

## 2024-06-16 NOTE — DISCHARGE SUMMARY
Discharge Summary    Date of Admission: 2024  Date of Discharge:  2024      Patient: Ynes Mcdaniel      MR#:[id number]    Delivery Provider: Anatoly Lo       Presenting Problem/History of Present Illness  Severe preeclampsia, third trimester [O14.13]       Postpartum care following  2024 - Marty Wu    Severe preeclampsia, third trimester         Discharge Diagnosis: Vaginal delivery at 35w5d    Procedures:  Vaginal, Spontaneous     2024    1:45 PM          Hospital Course  Patient is a 23 y.o. female  who was induced at 35w5d for severe preeclampsia.  She is status post vaginal delivery without complication.  She received 24 hours of magnesium sulfate.  Her BP is controlled on labetalol 300 tid.  Postpartum the patient did well. She remained afebrile, with vital signs stable. She was ready for discharge on postpartum day 2.  She will be discharged this afternoon, 24 hours after magnesium was complete, if her BP remains stable.  She will follow up in the office in one week for BP check.    Infant:   male  fetus 1965 g (4 lb 5.3 oz)  with Apgar scores of 8 , 9  at five minutes.    Condition on Discharge:  Stable    Vital Signs  Temp:  [97.7 °F (36.5 °C)-98.8 °F (37.1 °C)] 98.6 °F (37 °C)  Heart Rate:  [63-78] 67  Resp:  [16-20] 16  BP: (116-153)/(65-88) 139/66    Lab Results   Component Value Date    WBC 8.79 2024    HGB 11.1 (L) 06/15/2024    HCT 33.3 (L) 06/15/2024    MCV 90.6 2024     2024       Discharge Disposition  Home or Self Care    Discharge Medications     Discharge Medications        New Medications        Instructions Start Date   ibuprofen 600 MG tablet  Commonly known as: ADVIL,MOTRIN   600 mg, Oral, Every 6 Hours PRN      labetalol 300 MG tablet  Commonly known as: NORMODYNE   300 mg, Oral, Every 8 Hours Scheduled             Continue These Medications        Instructions Start Date   famotidine 20 MG tablet  Commonly known as:  Pepcid   20 mg, Oral, 2 Times Daily PRN      prenatal (CLASSIC) vitamin 28-0.8 MG tablet tablet  Generic drug: prenatal vitamin   1 tablet, Oral, Daily                   Follow-up Appointments  No future appointments.  Additional Instructions for the Follow-ups that You Need to Schedule       Discharge Follow-up with Specified Provider: sussy; 1 Week   As directed      To: sussy   Follow Up: 1 Week   Follow Up Details: bp check                Sandra Alberto, APRN  06/16/24  07:47 EDT  Csd

## 2024-06-16 NOTE — LACTATION NOTE
06/16/24 0845   Maternal Information   Date of Referral 06/16/24   Person Making Referral lactation consultant   Maternal Reason for Referral no prior breastfeeding experience;separation from infant   Infant Reason for Referral separation from mother;NICU admission;35-37 weeks gestation   Maternal Assessment   Left Nipple Symptoms nontender   Right Nipple Symptoms nontender   Maternal Infant Feeding   Maternal Emotional State receptive;independent   Support Person Involvement actively supporting mother   Milk Expression/Equipment   Breast Pump Type double electric, personal  (has spectra at bedside)   Equipment for Home Use breast pump provided;breast pump ordered through insurance   Breast Pumping   Breast Pumping Interventions frequent pumping encouraged   Lactation Referrals   Lactation Referrals outpatient lactation program   Outpatient Lactation Program Lactation Follow-up Date/Time as needed     Courtesy follow up visit on day of discharge. MOB with questions about breastmilk collection and storage. Reviewed pump settings and need for comfort; reviewed that cranking up the suction will not result in more milk if it is uncomfortable. Reviewed typical pump volumes. Encouraged to follow up as needed.

## 2024-06-17 NOTE — PAYOR COMM NOTE
"Ynes Mcdaniel (23 y.o. Female)     [facility] Auth#[id number]     Discharged 24.    From:Tita Dia LPN, Utilization Review  Phone #[phone]  Fax #[phone]        Date of Birth   2000    Social Security Number       Address   [address] [place] [address] [address] [place] [address]    Home Phone   [phone]    MRN   [id number]       Taoism   None    Marital Status   Single                            Admission Date   24    Admission Type   Elective    Admitting Provider   Anatoly Lo MD    Attending Provider       Department, Room/Bed   [facility] 4A, N405/1       Discharge Date   2024    Discharge Disposition   Home or Self Care    Discharge Destination                                 Attending Provider: (none)   Allergies: Amoxicillin-pot Clavulanate    Isolation: None   Infection: None   Code Status: Prior    Ht: 161.3 cm (63.5\")   Wt: 82.6 kg (182 lb)    Admission Cmt: None   Principal Problem: Postpartum care following  2024 - Marty Wu [Z39.2]                   Active Insurance as of 2024       Primary Coverage       Payor Plan Insurance Group Employer/Plan Group    [facility] [id number]       Payor Plan Address Payor Plan Phone Number Payor Plan Fax Number Effective Dates    [address]   2023 - None Entered    [facility] [id number]         Subscriber Name Subscriber Birth Date Member ID       MAYA MCDANIEL 1982 [id number]               Secondary Coverage       Payor Plan Insurance Group Employer/Plan Group    [employer] [place] [employer]        Payor Plan Address Payor Plan Phone Number Payor Plan Fax Number Effective Dates    [address] [address] [phone]  2024 - None Entered    [facility] [id number]         Subscriber Name Subscriber Birth Date Member ID       YNES MCDANIEL 2000 [id number]                     Emergency Contacts        (Rel.) Home Phone Work Phone Mobile Phone "    Dawson Mcdaniel (Father) -- -- [phone]    SPENCER SOMMER (Mother) -- -- [phone]              Insurance Information                  [facility]/peerTransfer Phone: --    Subscriber: Bunny Mcdaniel Subscriber#: [id number]    Group#: [id number] Precert#: [id number]        [facility]/WELL[employer]ARE MEDICAID Phone: [phone]    Subscriber: Ynes Mcdaniel Subscriber#: [id number]    Group#: -- Precert#: --             History & Physical        Anatoly Lo MD at 24          [facility]  Ynes Mcdaniel  : 2000  MRN: [id number]  CSN: [id number]    History and Physical    Subjective  Chief Complaint   Patient presents with    Elevated Blood Pressure     Ynes Mcdaniel is a 23 y.o. year old  with an Estimated Date of Delivery: 24 currently at 35w4d seen today in the office for routine prenatal visit.  Blood pressure was markedly elevated and she had 3+ protein.  She was sent to labor and delivery to be evaluated and induced for the diagnosis of severe preeclampsia.    Prenatal care has been with Dr. Lo.  It has been otherwise benign.    OB History    Para Term  AB Living   1 0 0 0 0 0   SAB IAB Ectopic Molar Multiple Live Births   0 0 0 0 0 0      # Outcome Date GA Lbr Nathan/2nd Weight Sex Type Anes PTL Lv   1 Current                Past Medical History:   Diagnosis Date    HSP (Henoch-Schonlein purpura) nephritis        Past Surgical History:   Procedure Laterality Date    RENAL BIOPSY Right     Dx with HSP nephritis       Medications Prior to Admission   Medication Sig Dispense Refill Last Dose    famotidine (Pepcid) 20 MG tablet Take 1 tablet by mouth 2 (Two) Times a Day As Needed for Heartburn.   2024 at 0930    prenatal vitamin (prenatal, CLASSIC, vitamin) tablet Take 1 tablet by mouth Daily.   Past Week at 1200       Allergies   Allergen Reactions    Amoxicillin-Pot Clavulanate Rash     Social History    Tobacco Use       "Smoking status: Never      Smokeless tobacco: Never    Review of Systems      Objective  /85 (BP Location: Left arm, Patient Position: Lying)   Pulse 75   Temp 98.1 °F (36.7 °C) (Oral)   Resp 16   Ht 161.3 cm (63.5\")   Wt 82.6 kg (182 lb)   LMP  (LMP Unknown)   SpO2 99%   BMI 31.73 kg/m²   General: well developed; well nourished  no acute distress   Abdomen: soft, non-tender; no masses  no umbilical or inguinal hernias are present  no hepato-splenomegaly   FHT's: reassuring and category 1      Cervix: was not checked.   Presentation: cephalic   Contractions: none     Prenatal Labs  Lab Results   Component Value Date    HGB 11.5 (L) 06/13/2024    RUBELLAABIGG 4.57 12/07/2023    HEPBSAG Non-Reactive 12/07/2023    ABSCRN Negative 06/13/2024    EPX4MEB3 Non-Reactive 12/07/2023    HEPCVIRUSABY Non-Reactive 12/07/2023    GCT 79 04/29/2024    URINECX 25,000 CFU/mL Lactobacillus species (A) 01/08/2024    CHLAMNAA Negative 01/08/2024    NGONORRHON Negative 01/08/2024       Current Labs Reviewed   CBC:   Lab Results   Component Value Date    WBC 8.79 06/13/2024    HGB 11.5 (L) 06/13/2024    HCT 34.7 06/13/2024     06/13/2024     Pre-eclampsia Panel:   Lab Results   Component Value Date    ALKPHOS 113 06/13/2024    AST 14 06/13/2024    ALT 9 06/13/2024    BILITOT <0.2 06/13/2024     (H) 06/13/2024    URICACID 5.6 06/13/2024    CREATININE 0.85 06/13/2024          Assessment  IUP at 35w4d  Preeclampsia with severe features  Unfavorable cervix     Plan  Admit  Magnesium sulfate for seizure prophylaxis  Begin induction with cervical Fraga along with oral Cytotec  Pitocin to begin in morning if not already in labor  Antibiotics empirically for group B strep unknown    Anatoly Lo MD  6/13/2024  20:09 EDT       Electronically signed by Anatoly Lo MD at 06/13/24 2011          Operative/Procedure Notes (last 7 days)        Anatoly Lo MD at 06/14/24 1415           " [facility]  Vaginal Delivery Note    Delivery details     Delivery: Vaginal, Spontaneous     YOB: 2023    Time of Birth: 1:45 PM      Anesthesia: Epidural     Delivering clinician: Anatoly Lo    Forceps?   No   Vacuum? No    Hospital Problem list: Postpartum care following  2024 - Marty Wu  Severe preeclampsia, third trimeste   Shoulder dystocia present: No      Delivery narrative: Spontaneous delivery from occiput anterior.  Mouth and nose bulb suction on perineum and again after delivery.  After 30 seconds of delay, cord clamped and placed on maternal abdomen.  After obtaining cord blood and cord segment placenta delivered spontaneous and intact with normal three-vessel cord.  Repair of right periurethral laceration with interrupted 3-0 chromic.  Repair of second-degree midline perineal laceration with 3-0 chromic.    Infant details    Findings: male  infant     Infant observations: Weight: 1965 g (4 lb 5.3 oz)   Length: 18.75  in   Apgars: 8  @ 1 minute /    9  @ 5 minutes     Placenta, Cord, and Fluid    Placenta delivered  Spontaneous  at   2024  1:49 PM     Cord: 3 vessels  present.   Nuchal Cord?  no   Cord blood obtained: Yes      Repair    Episiotomy: Not recorded    Estimated Blood Loss: Est. Blood Loss (mL): 200 mL (Filed from Delivery Summary) (24 1345)       Complications  none    Disposition  Mother to Mother Baby/Postpartum  in stable condition currently.  Baby to NBN  in stable condition currently.      Anatoly Lo MD  24  14:15 EDT        Electronically signed by Anatoly Lo MD at 24 1417          Discharge Summary        Sandra Alberto APRN at 24 0747          Discharge Summary    Date of Admission: 2024  Date of Discharge:  2024      Patient: Ynes Mcdaniel      MR#:[id number]    Delivery Provider: Anatoly Lo       Presenting Problem/History of Present Illness  Severe preeclampsia, third trimester  [O14.13]       Postpartum care following  2024 - Marty Wu    Severe preeclampsia, third trimester         Discharge Diagnosis: Vaginal delivery at 35w5d    Procedures:  Vaginal, Spontaneous     2024    1:45 PM          Hospital Course  Patient is a 23 y.o. female  who was induced at 35w5d for severe preeclampsia.  She is status post vaginal delivery without complication.  She received 24 hours of magnesium sulfate.  Her BP is controlled on labetalol 300 tid.  Postpartum the patient did well. She remained afebrile, with vital signs stable. She was ready for discharge on postpartum day 2.  She will be discharged this afternoon, 24 hours after magnesium was complete, if her BP remains stable.  She will follow up in the office in one week for BP check.    Infant:   male  fetus 1965 g (4 lb 5.3 oz)  with Apgar scores of 8 , 9  at five minutes.    Condition on Discharge:  Stable    Vital Signs  Temp:  [97.7 °F (36.5 °C)-98.8 °F (37.1 °C)] 98.6 °F (37 °C)  Heart Rate:  [63-78] 67  Resp:  [16-20] 16  BP: (116-153)/(65-88) 139/66    Lab Results   Component Value Date    WBC 8.79 2024    HGB 11.1 (L) 06/15/2024    HCT 33.3 (L) 06/15/2024    MCV 90.6 2024     2024       Discharge Disposition  Home or Self Care    Discharge Medications     Discharge Medications        New Medications        Instructions Start Date   ibuprofen 600 MG tablet  Commonly known as: ADVIL,MOTRIN   600 mg, Oral, Every 6 Hours PRN      labetalol 300 MG tablet  Commonly known as: NORMODYNE   300 mg, Oral, Every 8 Hours Scheduled             Continue These Medications        Instructions Start Date   famotidine 20 MG tablet  Commonly known as: Pepcid   20 mg, Oral, 2 Times Daily PRN      prenatal (CLASSIC) vitamin 28-0.8 MG tablet tablet  Generic drug: prenatal vitamin   1 tablet, Oral, Daily                   Follow-up Appointments  No future appointments.  Additional Instructions for the Follow-ups that You  Need to Schedule       Discharge Follow-up with Specified Provider: sussy; 1 Week   As directed      To: sussy   Follow Up: 1 Week   Follow Up Details: bp check                RANDALL Ferreira  06/16/24  07:47 EDT  Csd          Electronically signed by Sandra Alberto APRN at 06/16/24 3691

## 2024-06-20 ENCOUNTER — POSTPARTUM VISIT (OUTPATIENT)
Dept: OBSTETRICS AND GYNECOLOGY | Facility: CLINIC | Age: 24
End: 2024-06-20
Payer: COMMERCIAL

## 2024-06-20 VITALS
RESPIRATION RATE: 14 BRPM | DIASTOLIC BLOOD PRESSURE: 80 MMHG | SYSTOLIC BLOOD PRESSURE: 118 MMHG | WEIGHT: 163 LBS | BODY MASS INDEX: 28.42 KG/M2

## 2024-06-20 NOTE — PROGRESS NOTES
Subjective   Chief Complaint   Patient presents with    Blood Pressure Check       Ynesstacey Mcdaniel is a 23 y.o. year old .  No LMP recorded (lmp unknown).  She comes mainly for postpartum blood pressure follow-up.  She has been on labetalol but only taking it once a day.  She is actually missed doses the last couple of days.    The following portions of the patient's history were reviewed and updated as appropriate:current medications and allergies    Social History    Tobacco Use      Smoking status: Never      Smokeless tobacco: Never         Objective   /80   Resp 14   Wt 73.9 kg (163 lb)   LMP  (LMP Unknown)   Breastfeeding Yes   BMI 28.42 kg/m²     Lab Review   No data reviewed    Imaging   No data reviewed        Assessment   Postpartum hypertension historically on labetalol.  Blood pressures today are totally normal off labetalol and I think she can stop antihypertensive medication at this time     Plan   Reviewed increased recurrence risk for preeclampsia next pregnancy and the benefits of using baby aspirin initiated by 12 weeks gestation  The importance of keeping all planned follow-up and taking all medications as prescribed was emphasized.  Follow up for postpartum visit @ 6 weeks            This note was electronically signed.    Anatoly Lo M.D.  2024    Total time spent today with Ynes  was 20 minutes (level 3) - pathophysiology of her presenting problem along with plans for any diagnositc work-up and treatment.  The time reported only includes face-to-face time and excludes any procedural based activities that occurred on the same date of service.    Part of this note may be an electronic transcription/translation of spoken language to printed text using the Dragon Dictation System.

## 2024-06-25 ENCOUNTER — MATERNAL SCREENING (OUTPATIENT)
Dept: CALL CENTER | Facility: HOSPITAL | Age: 24
End: 2024-06-25
Payer: COMMERCIAL

## 2024-06-25 NOTE — OUTREACH NOTE
Maternal Screening Survey      Flowsheet Row Responses   Facility patient discharged from? [facility]   Attempt successful? Yes   Call start time    Call end time    EPD Scale: Able to Laugh 0-->as much as she always could   EPD Scale: Looked Forward 0-->as much as she ever did   EPD Scale: Blamed Self 0-->no, never   EPD Scale: Been Anxious 0-->no, not at all   EPD Scale: Felt Panicky 0-->no, not at all   EPD Scale: Things Getting on Top 0-->no, has been coping as well as ever   EPD Scale: Difficulty Sleeping 0-->no, not at all   EPD Scale: Sad or Miserable 0-->no, not at all   EPD Scale: Crying 0-->no, never   EPD Scale: Thought of Harming Self 0-->never   [place]  Depression Score 0   Did any of your parents have problems with alcohol or drug use? No   Do any of your peers have problems with alcohol or drug use? No   Does your partner have problems with alcohol or drug use? No   Before you were pregnant did you have problems with alcohol or drug use? (past) No   In the past month, did you drink beer, wine, liquor or use any other drugs? (pregnancy) No   Maternal Screening call completed Yes   Call end time               Emma WALSH - Registered Nurse

## 2024-06-25 NOTE — OUTREACH NOTE
Maternal Screening Survey      Flowsheet Row Responses   Facility patient discharged from? [facility]   Attempt successful? No   Unsuccessful attempts Attempt 1              Emma WALSH - Registered Nurse

## 2024-07-26 ENCOUNTER — POSTPARTUM VISIT (OUTPATIENT)
Dept: OBSTETRICS AND GYNECOLOGY | Facility: CLINIC | Age: 24
End: 2024-07-26
Payer: COMMERCIAL

## 2024-07-26 VITALS
BODY MASS INDEX: 28.77 KG/M2 | SYSTOLIC BLOOD PRESSURE: 112 MMHG | RESPIRATION RATE: 14 BRPM | WEIGHT: 165 LBS | DIASTOLIC BLOOD PRESSURE: 72 MMHG

## 2024-07-26 DIAGNOSIS — Z12.4 SCREENING FOR CERVICAL CANCER: ICD-10-CM

## 2024-07-26 PROBLEM — O14.13 SEVERE PREECLAMPSIA, THIRD TRIMESTER: Status: RESOLVED | Noted: 2024-06-13 | Resolved: 2024-07-26

## 2024-07-26 RX ORDER — ERGOCALCIFEROL (VITAMIN D2) 10 MCG
400 TABLET ORAL DAILY
COMMUNITY

## 2024-07-26 NOTE — PROGRESS NOTES
Subjective   Chief Complaint   Patient presents with    Postpartum Care     Ynes Mcdaniel is a 23 y.o. year old  presenting to be seen for her postpartum visit.  She had a vaginal delivery.  Her son is doing well.    She does not have concerns about post-partum blues/depression.   Brooklyn Score = 0  She is breast feeding and plans to continues for 1 year(s).  Since delivery she has not been sexually active.  For ongoing contraception, her plans are condoms.    The following portions of the patient's history were reviewed and updated as appropriate:current medications and allergies    Social History    Tobacco Use      Smoking status: Never      Smokeless tobacco: Never      Review of Systems  Constitutional POS: nothing reported    NEG: anorexia or night sweats   Genitourinary POS: nothing reported    NEG: dysuria or hematuria      Gastointestinal POS: nothing reported    NEG: bloating, change in bowel habits, melena, or reflux symptoms   Breast POS: nothing reported    NEG: persistent breast lump, skin dimpling, or nipple discharge        Objective   /72   Resp 14   Wt 74.8 kg (165 lb)   LMP  (LMP Unknown)   Breastfeeding Yes   BMI 28.77 kg/m²     General:  well developed; well nourished  no acute distress   Abdomen: soft, non-tender; no masses  no umbilical or inguinal hernias are present  no hepato-splenomegaly   Pelvis: Clinical staff was present for exam  External genitalia:  normal appearance of the external genitalia including Bartholin's and Godwin's glands.  :  urethral meatus normal;  Vaginal:  normal pink mucosa without prolapse or lesions.  Cervix:  normal appearance.  Uterus:  normal size, shape and consistency.  Adnexa:  normal bimanual exam of the adnexa.          Assessment   Normal 6 week postpartum exam S/P vaginal delivery  Sub-optimal BC - she currently is not taking sufficient folic acid     Plan   Pap was done today.  If she does not receive the results of the Pap  within 2 weeks  time, she was instructed to call to find out the results.  I explained to Ynes that the recommendations for Pap smear interval in a low risk patient's has lengthened to 3 years time.  I encouraged her to be seen yearly for a full physical exam including breast and pelvic exam even during the off years when PAP's will not be performed.  Folic acid for the prevention of neural tube defects was discussed.  At least 0.4 mg should be taken preconceptionally to reduce the risk.  It was explained that this may reduce the baseline incidence of neural tube defect by as much as 65%.  Folic acid can be found in OTC multivitamins, OTC prenatal vitamins or breakfast cereal that that contains 100% of the RDA of folate.  The importance of keeping all planned follow-up and taking all medications as prescribed was emphasized.  Follow up for annual exam 1 year           This note was electronically signed.    Anatoly Lo M.D.  July 26, 2024    Part of this note may be an electronic transcription/translation of spoken language to printed text using the Dragon Dictation System.

## 2024-08-02 LAB — REF LAB TEST METHOD: NORMAL

## 2025-08-07 ENCOUNTER — OFFICE VISIT (OUTPATIENT)
Dept: FAMILY MEDICINE CLINIC | Facility: CLINIC | Age: 25
End: 2025-08-07
Payer: COMMERCIAL

## 2025-08-07 ENCOUNTER — LAB (OUTPATIENT)
Dept: FAMILY MEDICINE CLINIC | Facility: CLINIC | Age: 25
End: 2025-08-07
Payer: COMMERCIAL

## 2025-08-07 DIAGNOSIS — Z00.00 GENERAL MEDICAL EXAM: ICD-10-CM

## 2025-08-07 DIAGNOSIS — Z00.00 GENERAL MEDICAL EXAM: Primary | ICD-10-CM

## 2025-08-07 DIAGNOSIS — N92.6 MISSED PERIOD: Primary | ICD-10-CM

## 2025-08-07 DIAGNOSIS — N92.6 MISSED PERIOD: ICD-10-CM

## 2025-08-07 DIAGNOSIS — R80.9 PROTEINURIA, UNSPECIFIED TYPE: ICD-10-CM

## 2025-08-07 LAB
ALBUMIN SERPL-MCNC: 4.6 G/DL (ref 3.5–5.2)
ALBUMIN/GLOB SERPL: 1.6 G/DL
ALP SERPL-CCNC: 74 U/L (ref 39–117)
ALT SERPL W P-5'-P-CCNC: 19 U/L (ref 1–33)
ANION GAP SERPL CALCULATED.3IONS-SCNC: 11.9 MMOL/L (ref 5–15)
AST SERPL-CCNC: 19 U/L (ref 1–32)
B-HCG UR QL: NEGATIVE
BILIRUB SERPL-MCNC: 0.2 MG/DL (ref 0–1.2)
BUN SERPL-MCNC: 13 MG/DL (ref 6–20)
BUN/CREAT SERPL: 16.7 (ref 7–25)
CALCIUM SPEC-SCNC: 9.8 MG/DL (ref 8.6–10.5)
CHLORIDE SERPL-SCNC: 106 MMOL/L (ref 98–107)
CO2 SERPL-SCNC: 21.1 MMOL/L (ref 22–29)
CREAT SERPL-MCNC: 0.78 MG/DL (ref 0.57–1)
DEPRECATED RDW RBC AUTO: 41.5 FL (ref 37–54)
EGFRCR SERPLBLD CKD-EPI 2021: 109.6 ML/MIN/1.73
ERYTHROCYTE [DISTWIDTH] IN BLOOD BY AUTOMATED COUNT: 12.1 % (ref 12.3–15.4)
ESTRADIOL SERPL HS-MCNC: 181 PG/ML
EXPIRATION DATE: NORMAL
FSH SERPL-ACNC: 3.47 MIU/ML
GLOBULIN UR ELPH-MCNC: 2.8 GM/DL
GLUCOSE SERPL-MCNC: 85 MG/DL (ref 65–99)
HCG INTACT+B SERPL-ACNC: <1 MIU/ML
HCT VFR BLD AUTO: 45.6 % (ref 34–46.6)
HGB BLD-MCNC: 15.6 G/DL (ref 12–15.9)
INTERNAL NEGATIVE CONTROL: NORMAL
INTERNAL POSITIVE CONTROL: NORMAL
LH SERPL-ACNC: 11.7 MIU/ML
Lab: NORMAL
MCH RBC QN AUTO: 31.8 PG (ref 26.6–33)
MCHC RBC AUTO-ENTMCNC: 34.2 G/DL (ref 31.5–35.7)
MCV RBC AUTO: 93.1 FL (ref 79–97)
PLATELET # BLD AUTO: 254 10*3/MM3 (ref 140–450)
PMV BLD AUTO: 11.6 FL (ref 6–12)
POTASSIUM SERPL-SCNC: 4.7 MMOL/L (ref 3.5–5.2)
PROGEST SERPL-MCNC: 10.5 NG/ML
PROT SERPL-MCNC: 7.4 G/DL (ref 6–8.5)
RBC # BLD AUTO: 4.9 10*6/MM3 (ref 3.77–5.28)
SODIUM SERPL-SCNC: 139 MMOL/L (ref 136–145)
TESTOST SERPL-MCNC: 36.7 NG/DL (ref 8.4–48.1)
WBC NRBC COR # BLD AUTO: 9.02 10*3/MM3 (ref 3.4–10.8)

## 2025-08-07 PROCEDURE — 85027 COMPLETE CBC AUTOMATED: CPT | Performed by: PHYSICIAN ASSISTANT

## 2025-08-07 PROCEDURE — 84702 CHORIONIC GONADOTROPIN TEST: CPT | Performed by: PHYSICIAN ASSISTANT

## 2025-08-07 PROCEDURE — 80053 COMPREHEN METABOLIC PANEL: CPT | Performed by: PHYSICIAN ASSISTANT

## 2025-08-07 PROCEDURE — 84403 ASSAY OF TOTAL TESTOSTERONE: CPT | Performed by: PHYSICIAN ASSISTANT

## 2025-08-07 PROCEDURE — 36415 COLL VENOUS BLD VENIPUNCTURE: CPT | Performed by: PHYSICIAN ASSISTANT

## 2025-08-07 PROCEDURE — 84144 ASSAY OF PROGESTERONE: CPT | Performed by: PHYSICIAN ASSISTANT

## 2025-08-07 PROCEDURE — 82670 ASSAY OF TOTAL ESTRADIOL: CPT | Performed by: PHYSICIAN ASSISTANT

## 2025-08-07 PROCEDURE — 83002 ASSAY OF GONADOTROPIN (LH): CPT | Performed by: PHYSICIAN ASSISTANT

## 2025-08-07 PROCEDURE — 83001 ASSAY OF GONADOTROPIN (FSH): CPT | Performed by: PHYSICIAN ASSISTANT

## 2025-08-08 ENCOUNTER — RESULTS FOLLOW-UP (OUTPATIENT)
Dept: FAMILY MEDICINE CLINIC | Facility: CLINIC | Age: 25
End: 2025-08-08
Payer: COMMERCIAL